# Patient Record
Sex: MALE | Race: WHITE | NOT HISPANIC OR LATINO | URBAN - NONMETROPOLITAN AREA
[De-identification: names, ages, dates, MRNs, and addresses within clinical notes are randomized per-mention and may not be internally consistent; named-entity substitution may affect disease eponyms.]

---

## 2018-08-28 ENCOUNTER — HOSPITAL ENCOUNTER (OUTPATIENT)
Dept: PHYSICAL THERAPY | Facility: HOSPITAL | Age: 43
Discharge: HOME | End: 2018-08-28

## 2018-08-28 PROCEDURE — 97799 UNLISTED PHYSCL MED/REHAB PX: CPT

## 2021-08-23 ENCOUNTER — NURSE TRIAGE (OUTPATIENT)
Dept: CALL CENTER | Facility: HOSPITAL | Age: 46
End: 2021-08-23

## 2021-08-23 ENCOUNTER — HOSPITAL ENCOUNTER (EMERGENCY)
Facility: HOSPITAL | Age: 46
Discharge: HOME | End: 2021-08-23
Attending: EMERGENCY MEDICINE | Admitting: EMERGENCY MEDICINE

## 2021-08-23 ENCOUNTER — TELEPHONE (OUTPATIENT)
Dept: EMERGENCY DEPT | Facility: HOSPITAL | Age: 46
End: 2021-08-23

## 2021-08-23 VITALS
RESPIRATION RATE: 18 BRPM | HEIGHT: 69 IN | WEIGHT: 180 LBS | SYSTOLIC BLOOD PRESSURE: 133 MMHG | TEMPERATURE: 100.5 F | OXYGEN SATURATION: 95 % | DIASTOLIC BLOOD PRESSURE: 76 MMHG | BODY MASS INDEX: 26.66 KG/M2 | HEART RATE: 106 BPM

## 2021-08-23 DIAGNOSIS — Z20.822 CLOSE EXPOSURE TO COVID-19 VIRUS: ICD-10-CM

## 2021-08-23 DIAGNOSIS — R50.9 FEVER, UNSPECIFIED FEVER CAUSE: ICD-10-CM

## 2021-08-23 DIAGNOSIS — R05.9 COUGH: Primary | ICD-10-CM

## 2021-08-23 LAB — SARS-COV-2 RNA RESP QL NAA+PROBE: DETECTED

## 2021-08-23 PROCEDURE — C9803 HOPD COVID-19 SPEC COLLECT: HCPCS

## 2021-08-23 PROCEDURE — 87635 SARS-COV-2 COVID-19 AMP PRB: CPT | Performed by: EMERGENCY MEDICINE

## 2021-08-23 PROCEDURE — 99283 EMERGENCY DEPT VISIT LOW MDM: CPT

## 2021-08-23 RX ORDER — ACETAMINOPHEN 500 MG
1000 TABLET ORAL ONCE
Status: COMPLETED | OUTPATIENT
Start: 2021-08-23 | End: 2021-08-23

## 2021-08-23 RX ADMIN — ACETAMINOPHEN 1000 MG: 500 TABLET, FILM COATED ORAL at 18:31

## 2021-08-23 NOTE — ED PROVIDER NOTES
Emergency Medicine Provider Note    Subjective:    HISTORY OF PRESENT ILLNESS     This is a very pleasant 46 y.o. male with no significant past medical history who presents to the emergency department today with a chief complaint of chills and body aches.  Gradual in onset over the past 1 day.  Constant.  Mild.  No exacerbating relieving factors and no associated symptoms.  No headache, vision changes, chest pain, shortness of breath, abdominal pain, nausea, vomiting, diarrhea, numbness, weakness, or paresthesias.  States multiple people have tested positive for COVID-19 at work and he would like to be tested.          History is obtained from the patient.       Review of Systems: All other systems are reviewed and are negative other than noted in the HPI.    Past Medical History:  No past medical history on file.    Allergies:  No Known Allergies    Past Surgical History:  No past surgical history on file.    Family History:  No family history on file.    Social History:  Social History     Socioeconomic History   • Marital status: Single     Spouse name: Not on file   • Number of children: Not on file   • Years of education: Not on file   • Highest education level: Not on file       Home Medications:  Prior to Admission medications    Not on File         Objective:    PHYSICAL EXAM     Vitals:   Vitals:    08/23/21 1747   BP: 133/76   Pulse: 106   Resp: 18   Temp: 100.5 °F (38.1 °C)   SpO2: 95%     GENERAL: Well appearing, in no acute distress.   HEENT: Moist mucous membranes, oropharynx clear without lesions, exudates, thrush.   EYES: No scleral icterus, conjunctivae clear.   NECK: No cervical lymphadenopathy, no stiffness.  CARDIAC: Normal rate, regular rhythm, no murmurs, 2+ peripheral pulses in all four extremities, normal capillary refill.   PULMONARY: Normal work of breathing on room air, lungs are clear to auscultation bilaterally without wheezes, crackles, rhonchi.  ABDOMINAL: Normal bowel sounds, abdomen  is soft, non-tender, non-distended, no hepatomegaly or splenomegaly.   MUSCULOSKELETAL: Normal range of motion, no lower extremity edema.  NEUROLOGIC: Alert and oriented x 3, EOM grossly intact and moves all four extremities with normal strength.  SKIN: Warm and dry without rashes.   PSYCHIATRIC: Mood and affect are normal.     PROCEDURES     Procedures    LAB AND RADIOLOGY RESULTS     Lab Results (last 24 hours)     ** No results found for the last 24 hours. **          No results found.    ED course:    Medications   acetaminophen (TYLENOL) tablet 1,000 mg (1,000 mg Oral Given 8/23/21 0541)          Amount and/or complexity of data reviewed:    • Clinical lab tests ordered and reviewed.  •       Risk of significant complications, morbidity, and/or mortality.    •  Presenting problem: low  •  Diagnostic procedures: low  •  Management options: low        MEDICAL DECISION MAKING     Patient presents with requesting COVID-19 testing. Upon arrival to the Emergency Department the patient is in no acute distress.  He does have a slight fever mild tachycardia.  He states he would just like a Covid test.  Low concern for meningitis with no headache, no neck stiffness, no rashes.  Low concern for pneumonia with no cough, no shortness of breath, no findings of this on physical exam.  Low concern for myocarditis with no chest pain.  Low concern for acute coronary syndrome or pulmonary embolism with no chest pain or shortness of breath.  Low concern for intra-abdominal infection with no abdominal pain, tenderness, nausea, or vomiting.  Low concern for urinary tract infection with no suprapubic pain, no flank pain, no dysuria.  Patient has no findings of cellulitis.  I did discuss that he has a fever and we could do a further work-up if you like but he states he just wants the COVID-19 test.  He has been tested for COVID-19.  He will be called with the results.        Diagnosis:    Final diagnoses:   Cough   Fever, unspecified  fever cause   Close exposure to COVID-19 virus         ED Disposition:     ED Disposition     ED Disposition Condition Comment    Discharge Stable           Provider, No Known  [facility]  [place] [address]  [phone]    Schedule an appointment as soon as possible for a visit in 2 days           Medication List      No changes were made to your prescriptions during this visit.              Lux Flanagan MD  08/25/21 4108

## 2021-08-23 NOTE — DISCHARGE INSTRUCTIONS
Please return immediately to the emergency department for any new or worsening symptoms. Please see your primary care provider in 2 days for re-evaluation of your symptoms.  As we discussed, if your fever persisting your Covid test is negative it could be another virus but if you develop any new symptoms return immediately to the ER for evaluation.

## 2021-08-24 ENCOUNTER — NURSE TRIAGE (OUTPATIENT)
Dept: CALL CENTER | Facility: HOSPITAL | Age: 46
End: 2021-08-24

## 2021-08-24 NOTE — TELEPHONE ENCOUNTER
"    Reason for Disposition  • [1] Follow-up call to recent contact AND [2] information only call, no triage required    Additional Information  • Negative: [1] Caller is not with the adult (patient) AND [2] reporting urgent symptoms  • Negative: Lab result questions  • Negative: Medication questions  • Negative: Caller can't be reached by phone  • Negative: Caller has already spoken to PCP or another triager  • Negative: RN needs further essential information from caller in order to complete triage  • Negative: Requesting regular office appointment  • Negative: [1] Caller requesting NON-URGENT health information AND [2] PCP's office is the best resource  • Negative: Health Information question, no triage required and triager able to answer question  • Negative: General information question, no triage required and triager able to answer question  • Negative: Question about upcoming scheduled test, no triage required and triager able to answer question  • Negative: [1] Caller is not with the adult (patient) AND [2] probable NON-URGENT symptoms    Answer Assessment - Initial Assessment Questions  1. REASON FOR CALL or QUESTION: \"What is your reason for calling today?\" or \"How can I best help you?\" or \"What question do you have that I can help answer?\"      Needing test result    Protocols used: INFORMATION ONLY CALL - NO TRIAGE-ADULT-      "

## 2021-08-24 NOTE — TELEPHONE ENCOUNTER
Reason for Disposition  • COVID-19 vaccine, Frequently Asked Questions (FAQs)    Additional Information  • Negative: COVID-19 lab test positive  • Negative: [1] Lives with someone known to have influenza (flu test positive) AND [2] flu-like symptoms (e.g., cough, runny nose, sore throat, SOB; with or without fever)  • Negative: [1] Symptoms of COVID-19 (e.g., cough, fever, SOB, or others) AND [2] HCP diagnosed COVID-19 based on symptoms  • Negative: [1] Symptoms of COVID-19 (e.g., cough, fever, SOB, or others) AND [2] lives in an area with community spread  • Negative: [1] Symptoms of COVID-19 (e.g., cough, fever, SOB, or others) AND [2] within 14 days of EXPOSURE (close contact) with diagnosed or suspected COVID-19 patient  • Negative: [1] Symptoms of COVID-19 (e.g., cough, fever, SOB, or others) AND [2] within 14 days of travel from high-risk area for COVID-19 community spread (identified by CDC)  • Negative: [1] Difficulty breathing (shortness of breath) occurs AND [2] onset > 14 days after COVID-19 EXPOSURE (Close Contact)  • Negative: [1] Dry cough occurs AND [2] onset > 14 days after COVID-19 EXPOSURE  • Negative: [1] Wet cough (i.e., white-yellow, yellow, green, or delia colored sputum) AND [2] onset > 14 days after COVID-19 EXPOSURE  • Negative: [1] Common cold symptoms AND [2] onset > 14 days after COVID-19 EXPOSURE  • [1] COVID-19 vaccine series completed (fully vaccinated) AND [2] COVID-19 EXPOSURE AND [3] no symptoms  • Negative: [1] Difficulty breathing or swallowing AND [2] starts within 2 hours after injection  • Negative: Sounds like a life-threatening emergency to the triager  • Negative: [1] Has NOT completed COVID-19 vaccine series AND [2] COVID-19 exposure AND [3] typical COVID-19 symptoms  • Negative: [1] Has NOT completed COVID-19 vaccine series AND [2]  COVID-19 exposure AND [3] no symptoms  • [1] COVID-19 vaccine series completed (fully vaccinated) AND [2] new-onset of COVID-19 symptoms  BUT [3] no known exposure  • Negative: [1] Typical COVID-19 symptoms AND [2] symptoms that are NOT expected from vaccine (e.g., cough, difficulty breathing, loss of taste or smell, runny nose, sore throat)  • Negative: [1] Typical COVID-19 symptoms AND [2] started > 3 days after getting vaccine  • Negative: Fever > 104 F (40 C)  • Negative: Sounds like a severe, unusual reaction to the triager  • Negative: [1] Redness or red streak around the injection site AND [2] started > 48 hours after getting vaccine AND [3] fever  • Negative: [1] Fever > 101 F (38.3 C) AND [2] age > 60 years AND [3] started > 48 hours after getting vaccine  • Negative: [1] Fever > 100.0 F (37.8 C) AND [2] bedridden (e.g., nursing home patient, CVA, chronic illness, recovering from surgery) AND [3] started > 48 hours after getting vaccine  • Negative: [1] Fever > 100.0 F (37.8 C) AND [2] diabetes mellitus or weak immune system (e.g., HIV positive, cancer chemo, splenectomy, organ transplant, chronic steroids) AND [3] started > 48 hours after getting vaccine  • Negative: Fever > 100.0 F (37.8 C) present > 3 days (72 hours)  • Negative: [1] Fever > 100.0 F (37.8 C) AND [2] healthcare worker  • Negative: [1] Redness around the injection site AND [2] started > 48 hours after getting vaccine AND [3] no fever  (Exception: red area < 1 inch or 2.5 cm wide)  • Negative: [1] Pain, tenderness, or swelling at the injection site AND [2] over 3 days (72 hours) since vaccine AND [3] getting worse  • Negative: [1] Pain, tenderness, or swelling at the injection site AND [2] lasts > 7 days  • Negative: [1] Lymph node swelling (i.e., armpit or neck on side of vaccine) AND [2] lasts > 3 weeks  • Negative: [1] Requesting COVID-19 vaccine AND [2] healthcare worker (e.g., EMS first responders, doctors, nurses)  • Negative: [1] Requesting COVID-19 vaccine AND [2] resident of a long-term care facility (e.g., nursing home)  • Negative: [1] Requesting COVID-19 vaccine  "AND [2] vaccine available in the community for this patient group  • Negative: COVID-19 vaccine, injection site reaction (e.g., pain, redness, swelling), question about  • Negative: COVID-19 vaccine, systemic reactions (e.g., fatigue, fever, muscle aches), questions about    Answer Assessment - Initial Assessment Questions  1. COVID-19 CLOSE CONTACT: \"Who is the person with the confirmed or suspected COVID-19 infection that you were exposed to?\"    son  2. PLACE of CONTACT: \"Where were you when you were exposed to COVID-19?\" (e.g., home, school, medical waiting room; which city?)     home  3. TYPE of CONTACT: \"How much contact was there?\" (e.g., sitting next to, live in same house, work in same office, same building)     [place]  4. DURATION of CONTACT: \"How long were you in contact with the COVID-19 patient?\" (e.g., a few seconds, passed by person, a few minutes, 15 minutes or longer, live with the patient)     General contact  5. MASK: \"Were you wearing a mask?\" \"Was the other person wearing a mask?\" Note: wearing a mask reduces the risk of an otherwise close contact.      no  6. DATE of CONTACT: \"When did you have contact with a COVID-19 patient?\" (e.g., how many days ago)      3 days  7. COMMUNITY SPREAD: \"Are there lots of cases of COVID-19 (community spread) where you live?\" (See public health department website, if unsure)        yes  8. SYMPTOMS: \"Do you have any symptoms?\" (e.g., fever, cough, breathing difficulty, loss of taste or smell)      None at the moment  9. PREGNANCY OR POSTPARTUM: \"Is there any chance you are pregnant?\" \"When was your last menstrual period?\" \"Did you deliver in the last 2 weeks?\"     no  10. HIGH RISK: \"Do you have any heart or lung problems?\" \"Do you have a weak immune system?\" (e.g., heart failure, COPD, asthma, HIV positive, chemotherapy, renal failure, diabetes mellitus, sickle cell anemia, obesity)       no  11. TRAVEL: \"Have you traveled out of the country recently?\" If " "Yes, ask: \"When and where?\" Also ask about out-of-state travel, since the [facility] has identified some high-risk cities for community spread in the [place]. Note: Travel becomes less relevant if there is widespread community transmission where the patient lives.        no    Answer Assessment - Initial Assessment Questions  1. MAIN CONCERN OR SYMPTOM:  \"What is your main concern right now?\" \"What question do you have?\" \"What's the main symptom you're worried about?\" (e.g., fever, pain, redness, swelling)    quarintine  2. VACCINE: \"What vaccination did you receive?\" \"Is this your first or second shot?\" (e.g., none; AstraZeneca, J&J, Moderna, Pfizer, other)      unsure  3. SYMPTOM ONSET: \"When did the not relevent begin?\" (e.g., not relevant; hours, days)       No symptoms  4. SYMPTOM SEVERITY: \"How bad is it?\"      na  5. FEVER: \"Is there a fever?\" If Yes, ask: \"What is it, how was it measured, and when did it start?\"      na  6. PAST REACTIONS: \"Have you reacted to immunizations before?\" If Yes, ask: \"What happened?\"     no  7. OTHER SYMPTOMS: \"Do you have any other symptoms?\"      no    Protocols used: CORONAVIRUS (COVID-19) VACCINE QUESTIONS AND REACTIONS-ADULT-AH, CORONAVIRUS (COVID-19) EXPOSURE-ADULT-AH      "

## 2022-08-25 PROCEDURE — 87635 SARS-COV-2 COVID-19 AMP PRB: CPT | Performed by: NURSE PRACTITIONER

## 2023-10-09 ENCOUNTER — APPOINTMENT (OUTPATIENT)
Dept: GENERAL RADIOLOGY | Age: 48
DRG: 282 | End: 2023-10-09

## 2023-10-09 ENCOUNTER — APPOINTMENT (OUTPATIENT)
Dept: CT IMAGING | Age: 48
DRG: 282 | End: 2023-10-09

## 2023-10-09 ENCOUNTER — HOSPITAL ENCOUNTER (INPATIENT)
Age: 48
LOS: 1 days | Discharge: HOME | DRG: 282 | End: 2023-10-10
Attending: EMERGENCY MEDICINE | Admitting: INTERNAL MEDICINE
Payer: COMMERCIAL

## 2023-10-09 DIAGNOSIS — I21.4 NSTEMI (NON-ST ELEVATED MYOCARDIAL INFARCTION) (HCC): Primary | ICD-10-CM

## 2023-10-09 LAB
ALBUMIN SERPL-MCNC: 3.9 G/DL (ref 3.5–5.2)
ALP SERPL-CCNC: 89 U/L (ref 40–130)
ALT SERPL-CCNC: 33 U/L (ref 5–41)
ANION GAP SERPL CALCULATED.3IONS-SCNC: 13 MMOL/L (ref 7–19)
APTT PPP: 24.1 SEC (ref 26–36.2)
APTT PPP: 41.2 SEC (ref 26–36.2)
AST SERPL-CCNC: 29 U/L (ref 5–40)
BASOPHILS # BLD: 0.1 K/UL (ref 0–0.2)
BASOPHILS NFR BLD: 1 % (ref 0–1)
BILIRUB SERPL-MCNC: 0.4 MG/DL (ref 0.2–1.2)
BNP BLD-MCNC: 145 PG/ML (ref 0–124)
BUN SERPL-MCNC: 12 MG/DL (ref 6–20)
CALCIUM SERPL-MCNC: 8.6 MG/DL (ref 8.6–10)
CHLORIDE SERPL-SCNC: 104 MMOL/L (ref 98–111)
CHOLEST SERPL-MCNC: 155 MG/DL (ref 160–199)
CO2 SERPL-SCNC: 25 MMOL/L (ref 22–29)
CREAT SERPL-MCNC: 1.1 MG/DL (ref 0.5–1.2)
D DIMER PPP FEU-MCNC: 0.75 UG/ML FEU (ref 0–0.48)
EKG P AXIS: 65 DEGREES
EKG P-R INTERVAL: 168 MS
EKG Q-T INTERVAL: 386 MS
EKG QRS DURATION: 92 MS
EKG QTC CALCULATION (BAZETT): 396 MS
EKG T AXIS: 57 DEGREES
EOSINOPHIL # BLD: 0.2 K/UL (ref 0–0.6)
EOSINOPHIL NFR BLD: 3.5 % (ref 0–5)
ERYTHROCYTE [DISTWIDTH] IN BLOOD BY AUTOMATED COUNT: 14.1 % (ref 11.5–14.5)
GLUCOSE SERPL-MCNC: 144 MG/DL (ref 74–109)
HBA1C MFR BLD: 5.6 % (ref 4–6)
HCT VFR BLD AUTO: 44.8 % (ref 42–52)
HDLC SERPL-MCNC: 40 MG/DL (ref 55–121)
HGB BLD-MCNC: 14.6 G/DL (ref 14–18)
IMM GRANULOCYTES # BLD: 0 K/UL
LDLC SERPL CALC-MCNC: 87 MG/DL
LYMPHOCYTES # BLD: 1.6 K/UL (ref 1.1–4.5)
LYMPHOCYTES NFR BLD: 34.2 % (ref 20–40)
MAGNESIUM SERPL-MCNC: 2.2 MG/DL (ref 1.6–2.6)
MCH RBC QN AUTO: 28.6 PG (ref 27–31)
MCHC RBC AUTO-ENTMCNC: 32.6 G/DL (ref 33–37)
MCV RBC AUTO: 87.8 FL (ref 80–94)
MONOCYTES # BLD: 0.8 K/UL (ref 0–0.9)
MONOCYTES NFR BLD: 16.5 % (ref 0–10)
NEUTROPHILS # BLD: 2.2 K/UL (ref 1.5–7.5)
NEUTS SEG NFR BLD: 44.8 % (ref 50–65)
PLATELET # BLD AUTO: 284 K/UL (ref 130–400)
PMV BLD AUTO: 9.6 FL (ref 9.4–12.4)
POTASSIUM SERPL-SCNC: 3.3 MMOL/L (ref 3.5–5)
PROT SERPL-MCNC: 6.7 G/DL (ref 6.6–8.7)
RBC # BLD AUTO: 5.1 M/UL (ref 4.7–6.1)
SODIUM SERPL-SCNC: 142 MMOL/L (ref 136–145)
TRIGL SERPL-MCNC: 139 MG/DL (ref 0–149)
TROPONIN T SERPL-MCNC: 0.1 NG/ML (ref 0–0.03)
TROPONIN T SERPL-MCNC: 0.24 NG/ML (ref 0–0.03)
TROPONIN T SERPL-MCNC: 0.26 NG/ML (ref 0–0.03)
WBC # BLD AUTO: 4.8 K/UL (ref 4.8–10.8)

## 2023-10-09 PROCEDURE — 6370000000 HC RX 637 (ALT 250 FOR IP): Performed by: EMERGENCY MEDICINE

## 2023-10-09 PROCEDURE — 96365 THER/PROPH/DIAG IV INF INIT: CPT

## 2023-10-09 PROCEDURE — 80061 LIPID PANEL: CPT

## 2023-10-09 PROCEDURE — 80053 COMPREHEN METABOLIC PANEL: CPT

## 2023-10-09 PROCEDURE — 83735 ASSAY OF MAGNESIUM: CPT

## 2023-10-09 PROCEDURE — 71275 CT ANGIOGRAPHY CHEST: CPT

## 2023-10-09 PROCEDURE — 83036 HEMOGLOBIN GLYCOSYLATED A1C: CPT

## 2023-10-09 PROCEDURE — 36415 COLL VENOUS BLD VENIPUNCTURE: CPT

## 2023-10-09 PROCEDURE — 85379 FIBRIN DEGRADATION QUANT: CPT

## 2023-10-09 PROCEDURE — 6360000004 HC RX CONTRAST MEDICATION: Performed by: EMERGENCY MEDICINE

## 2023-10-09 PROCEDURE — 2140000000 HC CCU INTERMEDIATE R&B

## 2023-10-09 PROCEDURE — 85025 COMPLETE CBC W/AUTO DIFF WBC: CPT

## 2023-10-09 PROCEDURE — 85730 THROMBOPLASTIN TIME PARTIAL: CPT

## 2023-10-09 PROCEDURE — 2580000003 HC RX 258: Performed by: NURSE PRACTITIONER

## 2023-10-09 PROCEDURE — 6360000002 HC RX W HCPCS: Performed by: EMERGENCY MEDICINE

## 2023-10-09 PROCEDURE — 6370000000 HC RX 637 (ALT 250 FOR IP): Performed by: INTERNAL MEDICINE

## 2023-10-09 PROCEDURE — 6370000000 HC RX 637 (ALT 250 FOR IP): Performed by: NURSE PRACTITIONER

## 2023-10-09 PROCEDURE — 93306 TTE W/DOPPLER COMPLETE: CPT

## 2023-10-09 PROCEDURE — 84484 ASSAY OF TROPONIN QUANT: CPT

## 2023-10-09 PROCEDURE — 83880 ASSAY OF NATRIURETIC PEPTIDE: CPT

## 2023-10-09 PROCEDURE — 71045 X-RAY EXAM CHEST 1 VIEW: CPT

## 2023-10-09 PROCEDURE — 99285 EMERGENCY DEPT VISIT HI MDM: CPT

## 2023-10-09 RX ORDER — SODIUM CHLORIDE 0.9 % (FLUSH) 0.9 %
5-40 SYRINGE (ML) INJECTION EVERY 12 HOURS SCHEDULED
Status: DISCONTINUED | OUTPATIENT
Start: 2023-10-09 | End: 2023-10-10 | Stop reason: HOSPADM

## 2023-10-09 RX ORDER — ASPIRIN 81 MG/1
324 TABLET, CHEWABLE ORAL ONCE
Status: COMPLETED | OUTPATIENT
Start: 2023-10-09 | End: 2023-10-09

## 2023-10-09 RX ORDER — ATORVASTATIN CALCIUM 40 MG/1
80 TABLET, FILM COATED ORAL NIGHTLY
Status: DISCONTINUED | OUTPATIENT
Start: 2023-10-09 | End: 2023-10-10 | Stop reason: HOSPADM

## 2023-10-09 RX ORDER — ACETAMINOPHEN 325 MG/1
650 TABLET ORAL EVERY 6 HOURS PRN
Status: DISCONTINUED | OUTPATIENT
Start: 2023-10-09 | End: 2023-10-10 | Stop reason: HOSPADM

## 2023-10-09 RX ORDER — HEPARIN SODIUM 1000 [USP'U]/ML
4000 INJECTION, SOLUTION INTRAVENOUS; SUBCUTANEOUS ONCE
Status: COMPLETED | OUTPATIENT
Start: 2023-10-09 | End: 2023-10-09

## 2023-10-09 RX ORDER — ACETAMINOPHEN 650 MG/1
650 SUPPOSITORY RECTAL EVERY 6 HOURS PRN
Status: DISCONTINUED | OUTPATIENT
Start: 2023-10-09 | End: 2023-10-10 | Stop reason: HOSPADM

## 2023-10-09 RX ORDER — ONDANSETRON 2 MG/ML
4 INJECTION INTRAMUSCULAR; INTRAVENOUS EVERY 6 HOURS PRN
Status: DISCONTINUED | OUTPATIENT
Start: 2023-10-09 | End: 2023-10-10 | Stop reason: HOSPADM

## 2023-10-09 RX ORDER — POLYETHYLENE GLYCOL 3350 17 G/17G
17 POWDER, FOR SOLUTION ORAL DAILY PRN
Status: DISCONTINUED | OUTPATIENT
Start: 2023-10-09 | End: 2023-10-10 | Stop reason: HOSPADM

## 2023-10-09 RX ORDER — HEPARIN SODIUM 1000 [USP'U]/ML
4000 INJECTION, SOLUTION INTRAVENOUS; SUBCUTANEOUS PRN
Status: DISCONTINUED | OUTPATIENT
Start: 2023-10-09 | End: 2023-10-10 | Stop reason: HOSPADM

## 2023-10-09 RX ORDER — HEPARIN SODIUM 1000 [USP'U]/ML
2000 INJECTION, SOLUTION INTRAVENOUS; SUBCUTANEOUS PRN
Status: DISCONTINUED | OUTPATIENT
Start: 2023-10-09 | End: 2023-10-10 | Stop reason: HOSPADM

## 2023-10-09 RX ORDER — HEPARIN SODIUM 10000 [USP'U]/100ML
5-30 INJECTION, SOLUTION INTRAVENOUS CONTINUOUS
Status: DISCONTINUED | OUTPATIENT
Start: 2023-10-09 | End: 2023-10-10 | Stop reason: HOSPADM

## 2023-10-09 RX ORDER — ASPIRIN 81 MG/1
81 TABLET, CHEWABLE ORAL DAILY
Status: DISCONTINUED | OUTPATIENT
Start: 2023-10-10 | End: 2023-10-10 | Stop reason: HOSPADM

## 2023-10-09 RX ORDER — SODIUM CHLORIDE 9 MG/ML
INJECTION, SOLUTION INTRAVENOUS PRN
Status: DISCONTINUED | OUTPATIENT
Start: 2023-10-09 | End: 2023-10-10 | Stop reason: HOSPADM

## 2023-10-09 RX ORDER — SODIUM CHLORIDE 0.9 % (FLUSH) 0.9 %
5-40 SYRINGE (ML) INJECTION PRN
Status: DISCONTINUED | OUTPATIENT
Start: 2023-10-09 | End: 2023-10-10 | Stop reason: HOSPADM

## 2023-10-09 RX ORDER — ONDANSETRON 4 MG/1
4 TABLET, ORALLY DISINTEGRATING ORAL EVERY 8 HOURS PRN
Status: DISCONTINUED | OUTPATIENT
Start: 2023-10-09 | End: 2023-10-10 | Stop reason: HOSPADM

## 2023-10-09 RX ADMIN — ACETAMINOPHEN 650 MG: 325 TABLET ORAL at 20:44

## 2023-10-09 RX ADMIN — ASPIRIN 324 MG: 81 TABLET, CHEWABLE ORAL at 07:18

## 2023-10-09 RX ADMIN — ATORVASTATIN CALCIUM 80 MG: 40 TABLET, FILM COATED ORAL at 20:31

## 2023-10-09 RX ADMIN — METOPROLOL TARTRATE 12.5 MG: 25 TABLET, FILM COATED ORAL at 11:53

## 2023-10-09 RX ADMIN — SODIUM CHLORIDE, PRESERVATIVE FREE 10 ML: 5 INJECTION INTRAVENOUS at 20:36

## 2023-10-09 RX ADMIN — HEPARIN SODIUM 12 UNITS/KG/HR: 10000 INJECTION, SOLUTION INTRAVENOUS at 08:12

## 2023-10-09 RX ADMIN — HEPARIN SODIUM 4000 UNITS: 1000 INJECTION INTRAVENOUS; SUBCUTANEOUS at 08:10

## 2023-10-09 RX ADMIN — IOPAMIDOL 75 ML: 755 INJECTION, SOLUTION INTRAVENOUS at 09:08

## 2023-10-09 RX ADMIN — HEPARIN SODIUM 4000 UNITS: 1000 INJECTION INTRAVENOUS; SUBCUTANEOUS at 20:34

## 2023-10-09 ASSESSMENT — PAIN DESCRIPTION - LOCATION
LOCATION: CHEST
LOCATION: HEAD

## 2023-10-09 ASSESSMENT — PAIN - FUNCTIONAL ASSESSMENT
PAIN_FUNCTIONAL_ASSESSMENT: 0-10
PAIN_FUNCTIONAL_ASSESSMENT: PREVENTS OR INTERFERES SOME ACTIVE ACTIVITIES AND ADLS

## 2023-10-09 ASSESSMENT — LIFESTYLE VARIABLES
HOW OFTEN DO YOU HAVE A DRINK CONTAINING ALCOHOL: NEVER
HOW MANY STANDARD DRINKS CONTAINING ALCOHOL DO YOU HAVE ON A TYPICAL DAY: PATIENT DOES NOT DRINK

## 2023-10-09 ASSESSMENT — ENCOUNTER SYMPTOMS
ABDOMINAL PAIN: 0
RESPIRATORY NEGATIVE: 1
EYES NEGATIVE: 1
GASTROINTESTINAL NEGATIVE: 1
COUGH: 0
NAUSEA: 0
TROUBLE SWALLOWING: 0
ABDOMINAL DISTENTION: 0
DIARRHEA: 0
CONSTIPATION: 0
SHORTNESS OF BREATH: 0
CHEST TIGHTNESS: 1
VOMITING: 0

## 2023-10-09 ASSESSMENT — PAIN DESCRIPTION - ORIENTATION
ORIENTATION: MID
ORIENTATION: LEFT

## 2023-10-09 ASSESSMENT — PAIN DESCRIPTION - DESCRIPTORS: DESCRIPTORS: ACHING

## 2023-10-09 ASSESSMENT — PAIN SCALES - GENERAL: PAINLEVEL_OUTOF10: 3

## 2023-10-09 NOTE — H&P
Gamaliel Espinoza - History & Physical      PCP: None, . (Inactive)    Date of Admission: 10/9/2023    Date of Service: 10/9/2023    Chief Complaint:  chest pain    History Of Present Illness: The patient is a 46 y.o. male past medical history of migraines who presented to [facility] ED complaining of chest pain. Patient reports around 630 this morning he began having chest tightness. He reports that his chest felt full. He reports having an odd sensation in his left arm that he cannot explain. Patient reports he also had diaphoresis at the time. Patient reports on Friday afternoon he began feeling ill with flulike symptoms. Patient denied cough or shortness of breath. Patient reports having fever, chills, and body aches. Patient reports symptoms resolved on Sunday and he is feeling back to normal.  In the ED patient was found to have elevated troponin of 0.10. EKG was unremarkable. Cardiology was consulted from the ED and patient was admitted to hospitalist service. Past Medical History:        Diagnosis Date    Anxiety        Past Surgical History:        Procedure Laterality Date    VENTRICULOPERITONEAL SHUNT         Home Medications:  Prior to Admission medications    Not on File       Allergies:    Patient has no known allergies. Social History:    The patient currently lives at home. Works at [employer]. Tobacco:   reports that he has never smoked. He has never used smokeless tobacco.  Alcohol:   reports no history of alcohol use. Illicit Drugs: denies    Family History:  History reviewed. No pertinent family history. Review of Systems:   Review of Systems   Constitutional:  Positive for chills, diaphoresis and fever. HENT:  Negative for trouble swallowing. Eyes:  Negative for visual disturbance. Respiratory:  Positive for chest tightness. Negative for cough and shortness of breath. Cardiovascular:  Positive for chest pain.  Negative for palpitations and leg

## 2023-10-09 NOTE — CONSULTS
[facility]  Cardiology Consult      Requesting MD:  Marni Eid MD   Admit Status:         History obtained from:   [] Patient  [] Other (specify):     Patient:  Alicja Gamez  [id number]     Chief Complaint:   Chief Complaint   Patient presents with    Chest Pain         HPI: Mr. Melly Kiran is a 46 y.o. male with a history of ventriculoperitoneal shunt placed in 1985 found to be nonfunctional 1996 prior tobacco abuse quit 7 years ago works as a  at the [employer] in [place] I believe in his usual state of health until this morning awakened and about 0 640 had onset of tightness in his chest fairly intense lasted a good 20+ minutes some diaphoresis and shortness of breath no nausea or vomiting came to the hospital symptoms are essentially subsided at this time. Troponin 0.1. EKG reported to be within normal limits. Denies any recent symptoms of exertional chest discomfort. CTA ordered. Echo ordered. Review of Systems:  Review of Systems   Constitutional: Negative. Negative for chills, fever and unexpected weight change. HENT: Negative. Eyes: Negative. Respiratory: Negative. Negative for shortness of breath. Cardiovascular: Negative. Negative for chest pain. Gastrointestinal: Negative. Negative for diarrhea, nausea and vomiting. Endocrine: Negative. Genitourinary: Negative. Musculoskeletal: Negative. Skin: Negative. Neurological: Negative. All other systems reviewed and are negative. Cardiac Specific Data:  Specialty Problems          Cardiology Problems    * (Principal) NSTEMI (non-ST elevated myocardial infarction) [facility])           Past Medical History:  Past Medical History:   Diagnosis Date    Anxiety         Past Surgical History:  Past Surgical History:   Procedure Laterality Date    VENTRICULOPERITONEAL SHUNT         Past Family History:  History reviewed. No pertinent family history.     Past Social

## 2023-10-10 ENCOUNTER — APPOINTMENT (OUTPATIENT)
Dept: CARDIAC CATH/INVASIVE PROCEDURES | Age: 48
DRG: 282 | End: 2023-10-10

## 2023-10-10 VITALS
SYSTOLIC BLOOD PRESSURE: 107 MMHG | BODY MASS INDEX: 26.38 KG/M2 | HEIGHT: 69 IN | WEIGHT: 178.13 LBS | RESPIRATION RATE: 16 BRPM | OXYGEN SATURATION: 97 % | DIASTOLIC BLOOD PRESSURE: 80 MMHG | TEMPERATURE: 97.7 F | HEART RATE: 75 BPM

## 2023-10-10 LAB
ALBUMIN SERPL-MCNC: 3.6 G/DL (ref 3.5–5.2)
ALP SERPL-CCNC: 86 U/L (ref 40–130)
ALT SERPL-CCNC: 32 U/L (ref 5–41)
ANION GAP SERPL CALCULATED.3IONS-SCNC: 8 MMOL/L (ref 7–19)
ANION GAP SERPL CALCULATED.3IONS-SCNC: 9 MMOL/L (ref 7–19)
APTT PPP: 102.9 SEC (ref 26–36.2)
APTT PPP: 84 SEC (ref 26–36.2)
AST SERPL-CCNC: 30 U/L (ref 5–40)
BILIRUB SERPL-MCNC: 0.3 MG/DL (ref 0.2–1.2)
BUN SERPL-MCNC: 14 MG/DL (ref 6–20)
BUN SERPL-MCNC: 15 MG/DL (ref 6–20)
CALCIUM SERPL-MCNC: 8.4 MG/DL (ref 8.6–10)
CALCIUM SERPL-MCNC: 8.6 MG/DL (ref 8.6–10)
CHLORIDE SERPL-SCNC: 104 MMOL/L (ref 98–111)
CHLORIDE SERPL-SCNC: 105 MMOL/L (ref 98–111)
CO2 SERPL-SCNC: 28 MMOL/L (ref 22–29)
CO2 SERPL-SCNC: 29 MMOL/L (ref 22–29)
CREAT SERPL-MCNC: 1 MG/DL (ref 0.5–1.2)
CREAT SERPL-MCNC: 1.1 MG/DL (ref 0.5–1.2)
ERYTHROCYTE [DISTWIDTH] IN BLOOD BY AUTOMATED COUNT: 14.5 % (ref 11.5–14.5)
GLUCOSE SERPL-MCNC: 102 MG/DL (ref 74–109)
GLUCOSE SERPL-MCNC: 104 MG/DL (ref 74–109)
HCT VFR BLD AUTO: 41.4 % (ref 42–52)
HGB BLD-MCNC: 13.4 G/DL (ref 14–18)
MCH RBC QN AUTO: 28.6 PG (ref 27–31)
MCHC RBC AUTO-ENTMCNC: 32.4 G/DL (ref 33–37)
MCV RBC AUTO: 88.3 FL (ref 80–94)
PLATELET # BLD AUTO: 253 K/UL (ref 130–400)
PMV BLD AUTO: 9.9 FL (ref 9.4–12.4)
POTASSIUM SERPL-SCNC: 3.6 MMOL/L (ref 3.5–5)
POTASSIUM SERPL-SCNC: 3.7 MMOL/L (ref 3.5–5)
PROT SERPL-MCNC: 6 G/DL (ref 6.6–8.7)
RBC # BLD AUTO: 4.69 M/UL (ref 4.7–6.1)
SODIUM SERPL-SCNC: 141 MMOL/L (ref 136–145)
SODIUM SERPL-SCNC: 142 MMOL/L (ref 136–145)
TROPONIN T SERPL-MCNC: 0.2 NG/ML (ref 0–0.03)
WBC # BLD AUTO: 5.1 K/UL (ref 4.8–10.8)

## 2023-10-10 PROCEDURE — 93458 L HRT ARTERY/VENTRICLE ANGIO: CPT

## 2023-10-10 PROCEDURE — 85027 COMPLETE CBC AUTOMATED: CPT

## 2023-10-10 PROCEDURE — 2709999900 HC NON-CHARGEABLE SUPPLY

## 2023-10-10 PROCEDURE — 6370000000 HC RX 637 (ALT 250 FOR IP): Performed by: NURSE PRACTITIONER

## 2023-10-10 PROCEDURE — 99152 MOD SED SAME PHYS/QHP 5/>YRS: CPT | Performed by: INTERNAL MEDICINE

## 2023-10-10 PROCEDURE — 6370000000 HC RX 637 (ALT 250 FOR IP): Performed by: INTERNAL MEDICINE

## 2023-10-10 PROCEDURE — 2500000003 HC RX 250 WO HCPCS

## 2023-10-10 PROCEDURE — 84484 ASSAY OF TROPONIN QUANT: CPT

## 2023-10-10 PROCEDURE — B2111ZZ FLUOROSCOPY OF MULTIPLE CORONARY ARTERIES USING LOW OSMOLAR CONTRAST: ICD-10-PCS | Performed by: INTERNAL MEDICINE

## 2023-10-10 PROCEDURE — 6360000002 HC RX W HCPCS: Performed by: EMERGENCY MEDICINE

## 2023-10-10 PROCEDURE — C1769 GUIDE WIRE: HCPCS

## 2023-10-10 PROCEDURE — 93458 L HRT ARTERY/VENTRICLE ANGIO: CPT | Performed by: INTERNAL MEDICINE

## 2023-10-10 PROCEDURE — 4A023N7 MEASUREMENT OF CARDIAC SAMPLING AND PRESSURE, LEFT HEART, PERCUTANEOUS APPROACH: ICD-10-PCS | Performed by: INTERNAL MEDICINE

## 2023-10-10 PROCEDURE — 85730 THROMBOPLASTIN TIME PARTIAL: CPT

## 2023-10-10 PROCEDURE — 6360000002 HC RX W HCPCS

## 2023-10-10 PROCEDURE — 99152 MOD SED SAME PHYS/QHP 5/>YRS: CPT

## 2023-10-10 PROCEDURE — 2580000003 HC RX 258: Performed by: INTERNAL MEDICINE

## 2023-10-10 PROCEDURE — C1894 INTRO/SHEATH, NON-LASER: HCPCS

## 2023-10-10 PROCEDURE — 80053 COMPREHEN METABOLIC PANEL: CPT

## 2023-10-10 PROCEDURE — B2151ZZ FLUOROSCOPY OF LEFT HEART USING LOW OSMOLAR CONTRAST: ICD-10-PCS | Performed by: INTERNAL MEDICINE

## 2023-10-10 PROCEDURE — 94760 N-INVAS EAR/PLS OXIMETRY 1: CPT

## 2023-10-10 PROCEDURE — 6360000004 HC RX CONTRAST MEDICATION: Performed by: INTERNAL MEDICINE

## 2023-10-10 PROCEDURE — 36415 COLL VENOUS BLD VENIPUNCTURE: CPT

## 2023-10-10 RX ORDER — HYDRALAZINE HYDROCHLORIDE 20 MG/ML
10 INJECTION INTRAMUSCULAR; INTRAVENOUS EVERY 10 MIN PRN
Status: DISCONTINUED | OUTPATIENT
Start: 2023-10-10 | End: 2023-10-10 | Stop reason: HOSPADM

## 2023-10-10 RX ORDER — NITROGLYCERIN 0.4 MG/1
0.4 TABLET SUBLINGUAL EVERY 5 MIN PRN
Qty: 25 TABLET | Refills: 3 | Status: SHIPPED | OUTPATIENT
Start: 2023-10-10

## 2023-10-10 RX ORDER — ASPIRIN 81 MG/1
81 TABLET, CHEWABLE ORAL DAILY
Qty: 90 TABLET | Refills: 3 | Status: SHIPPED | OUTPATIENT
Start: 2023-10-11

## 2023-10-10 RX ORDER — SODIUM CHLORIDE 9 MG/ML
INJECTION, SOLUTION INTRAVENOUS CONTINUOUS
Status: DISCONTINUED | OUTPATIENT
Start: 2023-10-10 | End: 2023-10-10 | Stop reason: HOSPADM

## 2023-10-10 RX ORDER — METOPROLOL SUCCINATE 25 MG/1
25 TABLET, EXTENDED RELEASE ORAL DAILY
Qty: 90 TABLET | Refills: 3 | Status: SHIPPED | OUTPATIENT
Start: 2023-10-10

## 2023-10-10 RX ORDER — ATORVASTATIN CALCIUM 80 MG/1
80 TABLET, FILM COATED ORAL NIGHTLY
Qty: 90 TABLET | Refills: 3 | Status: SHIPPED | OUTPATIENT
Start: 2023-10-10

## 2023-10-10 RX ORDER — NITROGLYCERIN 0.4 MG/1
0.4 TABLET SUBLINGUAL EVERY 5 MIN PRN
Qty: 25 TABLET | Refills: 3 | Status: CANCELLED | OUTPATIENT
Start: 2023-10-10

## 2023-10-10 RX ORDER — NITROGLYCERIN 0.4 MG/1
0.4 TABLET SUBLINGUAL EVERY 5 MIN PRN
Status: DISCONTINUED | OUTPATIENT
Start: 2023-10-10 | End: 2023-10-10 | Stop reason: HOSPADM

## 2023-10-10 RX ORDER — METOPROLOL SUCCINATE 25 MG/1
25 TABLET, EXTENDED RELEASE ORAL DAILY
Status: DISCONTINUED | OUTPATIENT
Start: 2023-10-10 | End: 2023-10-10 | Stop reason: HOSPADM

## 2023-10-10 RX ORDER — CLOPIDOGREL BISULFATE 75 MG/1
75 TABLET ORAL DAILY
Status: DISCONTINUED | OUTPATIENT
Start: 2023-10-10 | End: 2023-10-10 | Stop reason: HOSPADM

## 2023-10-10 RX ORDER — CLOPIDOGREL BISULFATE 75 MG/1
75 TABLET ORAL DAILY
Qty: 90 TABLET | Refills: 3 | Status: SHIPPED | OUTPATIENT
Start: 2023-10-10

## 2023-10-10 RX ORDER — CLOPIDOGREL BISULFATE 75 MG/1
75 TABLET ORAL DAILY
Qty: 90 TABLET | Refills: 3 | Status: CANCELLED | OUTPATIENT
Start: 2023-10-10

## 2023-10-10 RX ORDER — METOPROLOL SUCCINATE 25 MG/1
25 TABLET, EXTENDED RELEASE ORAL DAILY
Qty: 90 TABLET | Refills: 3 | Status: CANCELLED | OUTPATIENT
Start: 2023-10-10

## 2023-10-10 RX ORDER — SODIUM CHLORIDE 9 MG/ML
INJECTION, SOLUTION INTRAVENOUS CONTINUOUS
Status: ACTIVE | OUTPATIENT
Start: 2023-10-10 | End: 2023-10-10

## 2023-10-10 RX ADMIN — ASPIRIN 81 MG: 81 TABLET, CHEWABLE ORAL at 08:27

## 2023-10-10 RX ADMIN — IOPAMIDOL 110 ML: 612 INJECTION, SOLUTION INTRAVENOUS at 10:31

## 2023-10-10 RX ADMIN — SODIUM CHLORIDE: 9 INJECTION, SOLUTION INTRAVENOUS at 08:29

## 2023-10-10 RX ADMIN — HEPARIN SODIUM 15 UNITS/KG/HR: 10000 INJECTION, SOLUTION INTRAVENOUS at 06:43

## 2023-10-10 RX ADMIN — CLOPIDOGREL BISULFATE 75 MG: 75 TABLET ORAL at 11:37

## 2023-10-10 RX ADMIN — METOPROLOL SUCCINATE 25 MG: 25 TABLET, FILM COATED, EXTENDED RELEASE ORAL at 11:37

## 2023-10-10 NOTE — PROGRESS NOTES
Arrived to cath holding from 717 to await a cardiac cath. Denies any c/o pain. Has Heparin gtt infusing at 15 units/kg/ hr and NS infusing at 75ml hr. Both groin sites prepped  and shaved and pulses checked.

## 2023-10-10 NOTE — PROGRESS NOTES
Catheterization pulmonary findings right radial artery access tolerated well  Left ventricular function satisfactory  Coronary angiograms no significant lesions minimal luminal irregularities medical management recommended May be discharged later today on medical therapy.

## 2023-10-10 NOTE — DISCHARGE SUMMARY
Say Mathew  :  1975  MRN:  [id number]    Admit date:  10/9/2023  Discharge date:  10/10/2023    Discharging Physician:  Dr. Iva Michaud Directive: Full Code    Consults: Kmiani So     Primary Care Physician:  None, . (Inactive)    Discharge Diagnoses:  Principal Problem:    NSTEMI (non-ST elevated myocardial infarction) ([address])  Resolved Problems:    * No resolved hospital problems. *      Portions of this note have been copied forward, however, changed to reflect the most current clinical status of this patient. Hospital Course: The patient is a 46 y.o. male past medical history of migraines who presented to [facility] ED complaining of chest pain. Patient reports around 630 this morning he began having chest tightness. He reports that his chest felt full. He reports having an odd sensation in his left arm that he cannot explain. Patient reports he also had diaphoresis at the time. Patient reports on Friday afternoon he began feeling ill with flulike symptoms. Patient denied cough or shortness of breath. Patient reports having fever, chills, and body aches. Patient reports symptoms resolved on  and he is feeling back to normal.  In the ED patient was found to have elevated troponin of 0.10. EKG was unremarkable. Cardiology was consulted from the ED and patient was admitted to hospitalist service. Patient was placed on heparin drip at admission. Echo obtained, indicated EF of 60% with normal left ventricular cavity and overall normal systolic function, mild LVH. Monitored on telemetry overnight without ectopy. Troponin was trended 0.10, 0.24, 0.26, 0.20. Cardiology took patient for cardiac catheterization which indicated significant lesions and minimal luminal irregularities. Cardiology recommends medical management with aspirin, Plavix, and statin therapy. Patient has scheduled follow-up with cardiology.   At this point patient is now medically

## 2023-10-11 ENCOUNTER — TELEPHONE (OUTPATIENT)
Dept: PRIMARY CARE CLINIC | Age: 48
End: 2023-10-11

## 2023-10-11 NOTE — TELEPHONE ENCOUNTER
Tried to call patient to schedule him a Hospital Follow up, I was unable to leave a message. I will try again at a later date.

## 2023-10-11 NOTE — TELEPHONE ENCOUNTER
Care Transitions Initial Follow Up Call  Patient will be a new patient unable to schedule appointment. Outreach made within 2 business days of discharge: Yes    Patient: Say Mathew   Patient : 1975   MRN: [id number]   Reason for Admission: Chest pain    Discharge Date: 10/10/23    Discharge Diagnoses:  Principal Problem:    NSTEMI (non-ST elevated myocardial infarction) [facility])  Resolved Problems:    * No resolved hospital problems. *     Spoke with: Unable to contact patient at this time. Will reach out at a later date.    Discharge department/facility: [address] [place] [address]    Scheduled appointment with PCP within 7-14 days    Follow Up  Future Appointments   Date Time Provider [address] [place] [address]   2023  2:45 PM Gold Spear MD [email or web address], [place]

## 2023-10-12 ENCOUNTER — TELEPHONE (OUTPATIENT)
Dept: PRIMARY CARE CLINIC | Age: 48
End: 2023-10-12

## 2023-10-12 NOTE — TELEPHONE ENCOUNTER
Care Transitions Initial Follow Up Call    Outreach made within 2 business days of discharge: Yes    Patient: Manuela Hartmann   Patient : 1975   MRN: [id number]   Reason for Admission:Chest pain   Discharge Date: 10/10/23    Discharge Diagnoses:  Principal Problem:    NSTEMI (non-ST elevated myocardial infarction) [facility])  Resolved Problems:    * No resolved hospital problems. *                   Spoke with: Attempted to make contact with patient for an transitions of care follow up call post discharge without success.       Discharge department/facility: [address] [place] [address]        Scheduled appointment with PCP within 7-14 days    Follow Up  Future Appointments   Date Time Provider [address] [place] [address]   2023  2:45 PM Karla Black MD N [facility] [place]       Formerly [facility], [address] [place] [address]

## 2023-10-30 ENCOUNTER — HOSPITAL ENCOUNTER (INPATIENT)
Age: 48
LOS: 7 days | Discharge: HOME | DRG: 844 | End: 2023-11-06
Attending: EMERGENCY MEDICINE | Admitting: INTERNAL MEDICINE
Payer: COMMERCIAL

## 2023-10-30 DIAGNOSIS — K92.2 GI BLEED: ICD-10-CM

## 2023-10-30 DIAGNOSIS — R55 SYNCOPE AND COLLAPSE: ICD-10-CM

## 2023-10-30 DIAGNOSIS — I95.9 HYPOTENSION, UNSPECIFIED HYPOTENSION TYPE: ICD-10-CM

## 2023-10-30 DIAGNOSIS — K92.2 ACUTE GI BLEEDING: Primary | ICD-10-CM

## 2023-10-30 DIAGNOSIS — D64.9 SYMPTOMATIC ANEMIA: ICD-10-CM

## 2023-10-30 LAB
ABO/RH: NORMAL
ALBUMIN SERPL-MCNC: 3.9 G/DL (ref 3.5–5.2)
ALP SERPL-CCNC: 77 U/L (ref 40–130)
ALT SERPL-CCNC: 17 U/L (ref 5–41)
ANION GAP SERPL CALCULATED.3IONS-SCNC: 9 MMOL/L (ref 7–19)
ANTIBODY SCREEN: NORMAL
APTT PPP: 26.2 SEC (ref 26–36.2)
AST SERPL-CCNC: 14 U/L (ref 5–40)
BASOPHILS # BLD: 0.1 K/UL (ref 0–0.2)
BASOPHILS NFR BLD: 0.9 % (ref 0–1)
BILIRUB SERPL-MCNC: 0.3 MG/DL (ref 0.2–1.2)
BLOOD BANK DISPENSE STATUS: NORMAL
BLOOD BANK DISPENSE STATUS: NORMAL
BLOOD BANK PRODUCT CODE: NORMAL
BLOOD BANK PRODUCT CODE: NORMAL
BPU ID: NORMAL
BPU ID: NORMAL
BUN SERPL-MCNC: 17 MG/DL (ref 6–20)
CALCIUM SERPL-MCNC: 8.4 MG/DL (ref 8.6–10)
CHLORIDE SERPL-SCNC: 106 MMOL/L (ref 98–111)
CO2 SERPL-SCNC: 26 MMOL/L (ref 22–29)
CREAT SERPL-MCNC: 0.9 MG/DL (ref 0.5–1.2)
DESCRIPTION BLOOD BANK: NORMAL
DESCRIPTION BLOOD BANK: NORMAL
EOSINOPHIL # BLD: 0.2 K/UL (ref 0–0.6)
EOSINOPHIL NFR BLD: 1.8 % (ref 0–5)
ERYTHROCYTE [DISTWIDTH] IN BLOOD BY AUTOMATED COUNT: 14.3 % (ref 11.5–14.5)
GLUCOSE SERPL-MCNC: 125 MG/DL (ref 74–109)
HCT VFR BLD AUTO: 37.9 % (ref 42–52)
HCT VFR BLD AUTO: 41.3 % (ref 42–52)
HGB BLD-MCNC: 12.6 G/DL (ref 14–18)
HGB BLD-MCNC: 13.8 G/DL (ref 14–18)
IMM GRANULOCYTES # BLD: 0 K/UL
INR PPP: 1.14 (ref 0.88–1.18)
LACTATE BLDV-SCNC: 1.8 MG/DL (ref 0.5–1.9)
LYMPHOCYTES # BLD: 3.3 K/UL (ref 1.1–4.5)
LYMPHOCYTES NFR BLD: 37.3 % (ref 20–40)
MCH RBC QN AUTO: 29.4 PG (ref 27–31)
MCHC RBC AUTO-ENTMCNC: 33.2 G/DL (ref 33–37)
MCV RBC AUTO: 88.6 FL (ref 80–94)
MONOCYTES # BLD: 0.6 K/UL (ref 0–0.9)
MONOCYTES NFR BLD: 6.6 % (ref 0–10)
NEUTROPHILS # BLD: 4.8 K/UL (ref 1.5–7.5)
NEUTS SEG NFR BLD: 53.3 % (ref 50–65)
PLATELET # BLD AUTO: 365 K/UL (ref 130–400)
PMV BLD AUTO: 9.7 FL (ref 9.4–12.4)
POTASSIUM SERPL-SCNC: 4.1 MMOL/L (ref 3.5–5)
PROT SERPL-MCNC: 6.1 G/DL (ref 6.6–8.7)
PROTHROMBIN TIME: 14.3 SEC (ref 12–14.6)
RBC # BLD AUTO: 4.28 M/UL (ref 4.7–6.1)
SODIUM SERPL-SCNC: 141 MMOL/L (ref 136–145)
TROPONIN T SERPL-MCNC: <0.01 NG/ML (ref 0–0.03)
WBC # BLD AUTO: 8.9 K/UL (ref 4.8–10.8)

## 2023-10-30 PROCEDURE — 96374 THER/PROPH/DIAG INJ IV PUSH: CPT

## 2023-10-30 PROCEDURE — 86900 BLOOD TYPING SEROLOGIC ABO: CPT

## 2023-10-30 PROCEDURE — 2100000000 HC CCU R&B

## 2023-10-30 PROCEDURE — 85014 HEMATOCRIT: CPT

## 2023-10-30 PROCEDURE — 2580000003 HC RX 258: Performed by: EMERGENCY MEDICINE

## 2023-10-30 PROCEDURE — 85025 COMPLETE CBC W/AUTO DIFF WBC: CPT

## 2023-10-30 PROCEDURE — 85730 THROMBOPLASTIN TIME PARTIAL: CPT

## 2023-10-30 PROCEDURE — 6360000002 HC RX W HCPCS

## 2023-10-30 PROCEDURE — 99285 EMERGENCY DEPT VISIT HI MDM: CPT

## 2023-10-30 PROCEDURE — 84484 ASSAY OF TROPONIN QUANT: CPT

## 2023-10-30 PROCEDURE — 86923 COMPATIBILITY TEST ELECTRIC: CPT

## 2023-10-30 PROCEDURE — 80053 COMPREHEN METABOLIC PANEL: CPT

## 2023-10-30 PROCEDURE — 86850 RBC ANTIBODY SCREEN: CPT

## 2023-10-30 PROCEDURE — 36415 COLL VENOUS BLD VENIPUNCTURE: CPT

## 2023-10-30 PROCEDURE — 85018 HEMOGLOBIN: CPT

## 2023-10-30 PROCEDURE — 86920 COMPATIBILITY TEST SPIN: CPT

## 2023-10-30 PROCEDURE — 2580000003 HC RX 258: Performed by: INTERNAL MEDICINE

## 2023-10-30 PROCEDURE — 85610 PROTHROMBIN TIME: CPT

## 2023-10-30 PROCEDURE — P9040 RBC LEUKOREDUCED IRRADIATED: HCPCS

## 2023-10-30 PROCEDURE — P9016 RBC LEUKOCYTES REDUCED: HCPCS

## 2023-10-30 PROCEDURE — 83605 ASSAY OF LACTIC ACID: CPT

## 2023-10-30 PROCEDURE — P9073 PLATELETS PHERESIS PATH REDU: HCPCS

## 2023-10-30 PROCEDURE — C9113 INJ PANTOPRAZOLE SODIUM, VIA: HCPCS | Performed by: EMERGENCY MEDICINE

## 2023-10-30 PROCEDURE — 6360000002 HC RX W HCPCS: Performed by: EMERGENCY MEDICINE

## 2023-10-30 PROCEDURE — 86901 BLOOD TYPING SEROLOGIC RH(D): CPT

## 2023-10-30 PROCEDURE — 93005 ELECTROCARDIOGRAM TRACING: CPT

## 2023-10-30 RX ORDER — SODIUM CHLORIDE 9 MG/ML
INJECTION, SOLUTION INTRAVENOUS PRN
Status: DISCONTINUED | OUTPATIENT
Start: 2023-10-30 | End: 2023-11-01 | Stop reason: ALTCHOICE

## 2023-10-30 RX ORDER — HYDROCORTISONE 25 MG/G
CREAM TOPICAL 2 TIMES DAILY
Status: DISCONTINUED | OUTPATIENT
Start: 2023-10-30 | End: 2023-11-06 | Stop reason: HOSPADM

## 2023-10-30 RX ORDER — ONDANSETRON 2 MG/ML
4 INJECTION INTRAMUSCULAR; INTRAVENOUS EVERY 6 HOURS PRN
Status: DISCONTINUED | OUTPATIENT
Start: 2023-10-30 | End: 2023-11-06 | Stop reason: HOSPADM

## 2023-10-30 RX ORDER — SODIUM CHLORIDE 0.9 % (FLUSH) 0.9 %
10 SYRINGE (ML) INJECTION EVERY 12 HOURS SCHEDULED
Status: DISCONTINUED | OUTPATIENT
Start: 2023-10-30 | End: 2023-11-01

## 2023-10-30 RX ORDER — ACETAMINOPHEN 650 MG/1
650 SUPPOSITORY RECTAL EVERY 6 HOURS PRN
Status: DISCONTINUED | OUTPATIENT
Start: 2023-10-30 | End: 2023-11-06 | Stop reason: HOSPADM

## 2023-10-30 RX ORDER — ONDANSETRON 2 MG/ML
4 INJECTION INTRAMUSCULAR; INTRAVENOUS ONCE
Status: COMPLETED | OUTPATIENT
Start: 2023-10-30 | End: 2023-10-30

## 2023-10-30 RX ORDER — PANTOPRAZOLE SODIUM 40 MG/10ML
80 INJECTION, POWDER, LYOPHILIZED, FOR SOLUTION INTRAVENOUS ONCE
Status: COMPLETED | OUTPATIENT
Start: 2023-10-30 | End: 2023-10-30

## 2023-10-30 RX ORDER — ONDANSETRON 2 MG/ML
INJECTION INTRAMUSCULAR; INTRAVENOUS
Status: COMPLETED
Start: 2023-10-30 | End: 2023-10-30

## 2023-10-30 RX ORDER — SODIUM CHLORIDE 0.9 % (FLUSH) 0.9 %
10 SYRINGE (ML) INJECTION PRN
Status: DISCONTINUED | OUTPATIENT
Start: 2023-10-30 | End: 2023-11-06 | Stop reason: HOSPADM

## 2023-10-30 RX ORDER — SODIUM CHLORIDE 9 MG/ML
INJECTION, SOLUTION INTRAVENOUS PRN
Status: DISCONTINUED | OUTPATIENT
Start: 2023-10-30 | End: 2023-11-06 | Stop reason: HOSPADM

## 2023-10-30 RX ORDER — SODIUM CHLORIDE 9 MG/ML
INJECTION, SOLUTION INTRAVENOUS CONTINUOUS
Status: DISCONTINUED | OUTPATIENT
Start: 2023-10-30 | End: 2023-11-01

## 2023-10-30 RX ORDER — ONDANSETRON 4 MG/1
4 TABLET, ORALLY DISINTEGRATING ORAL EVERY 8 HOURS PRN
Status: DISCONTINUED | OUTPATIENT
Start: 2023-10-30 | End: 2023-11-06 | Stop reason: HOSPADM

## 2023-10-30 RX ORDER — ACETAMINOPHEN 325 MG/1
650 TABLET ORAL EVERY 6 HOURS PRN
Status: DISCONTINUED | OUTPATIENT
Start: 2023-10-30 | End: 2023-11-06 | Stop reason: HOSPADM

## 2023-10-30 RX ADMIN — ONDANSETRON 4 MG: 2 INJECTION INTRAMUSCULAR; INTRAVENOUS at 15:54

## 2023-10-30 RX ADMIN — PANTOPRAZOLE SODIUM 8 MG/HR: 40 INJECTION, POWDER, FOR SOLUTION INTRAVENOUS at 15:36

## 2023-10-30 RX ADMIN — ONDANSETRON 4 MG: 2 INJECTION INTRAMUSCULAR; INTRAVENOUS at 17:12

## 2023-10-30 RX ADMIN — SODIUM CHLORIDE: 9 INJECTION, SOLUTION INTRAVENOUS at 21:51

## 2023-10-30 RX ADMIN — PANTOPRAZOLE SODIUM 80 MG: 40 INJECTION, POWDER, FOR SOLUTION INTRAVENOUS at 16:24

## 2023-10-30 RX ADMIN — SODIUM CHLORIDE, PRESERVATIVE FREE 10 ML: 5 INJECTION INTRAVENOUS at 21:15

## 2023-10-30 ASSESSMENT — LIFESTYLE VARIABLES: HOW OFTEN DO YOU HAVE A DRINK CONTAINING ALCOHOL: NEVER

## 2023-10-30 ASSESSMENT — ENCOUNTER SYMPTOMS
SHORTNESS OF BREATH: 0
NAUSEA: 1
COUGH: 0
BLOOD IN STOOL: 1
VOMITING: 0

## 2023-10-30 NOTE — H&P
pulmonary arterial filling defect is seen. No lymphadenopathy detected. No consolidation, pleural effusion or pneumothorax identified. No acute abnormality is seen within the upper abdomen. No acute osseous abnormality is detected.  ------------------------------     No pulmonary embolus or other acute cardiopulmonary process. ---------------------------  All CT scans are performed using dose optimization techniques as appropriate to the performed exam and include at least one of the following: Automated exposure control, adjustment of the mA and/or kV according to size, and the use of iterative reconstruction technique. ______________________________________ Electronically signed by: Graciela Simon M.D. Date:     10/09/2023 Time:    09:25     XR CHEST PORTABLE    Result Date: 10/9/2023  EXAM: CHEST RADIOGRAPH  TECHNIQUE: Single frontal chest radiograph. HISTORY: Chest pain. COMPARISON: None. FINDINGS:  All the patient is mildly leaning to the right. EKG leads project over the chest.  A ventriculoperitoneal shunt catheter projects over the right chest.  Calcification along portions of the catheter. No pulmonary infiltrate is identified. No pleural effusion or pneumothorax is seen. Heart size is normal. No acute displaced rib fractures are identified. 1.  No acute findings in the chest.    ______________________________________ Electronically signed by: Martín Oliveira M.D. Date:     10/09/2023 Time:    09:16         Assessment and Plan     Lower GI bleed. Follow serial H&H. Transfuse as needed. Fluid resuscitation. GI evaluation. Please document 40 minutes of critical care time for patient assessment, chart review, discussion with staff, .       Martinez Villanueva DO

## 2023-10-31 ENCOUNTER — APPOINTMENT (OUTPATIENT)
Dept: CT IMAGING | Age: 48
DRG: 844 | End: 2023-10-31
Payer: COMMERCIAL

## 2023-10-31 LAB
ALBUMIN SERPL-MCNC: 3.2 G/DL (ref 3.5–5.2)
ALP SERPL-CCNC: 64 U/L (ref 40–130)
ALT SERPL-CCNC: 14 U/L (ref 5–41)
ANION GAP SERPL CALCULATED.3IONS-SCNC: 10 MMOL/L (ref 7–19)
AST SERPL-CCNC: 12 U/L (ref 5–40)
BASOPHILS # BLD: 0 K/UL (ref 0–0.2)
BASOPHILS NFR BLD: 0.4 % (ref 0–1)
BILIRUB SERPL-MCNC: 0.9 MG/DL (ref 0.2–1.2)
BILIRUB UR QL STRIP: NEGATIVE
BUN SERPL-MCNC: 16 MG/DL (ref 6–20)
CALCIUM SERPL-MCNC: 7.8 MG/DL (ref 8.6–10)
CHLORIDE SERPL-SCNC: 106 MMOL/L (ref 98–111)
CLARITY UR: CLEAR
CO2 SERPL-SCNC: 23 MMOL/L (ref 22–29)
COLOR UR: YELLOW
CREAT SERPL-MCNC: 0.9 MG/DL (ref 0.5–1.2)
EOSINOPHIL # BLD: 0.1 K/UL (ref 0–0.6)
EOSINOPHIL NFR BLD: 1.1 % (ref 0–5)
ERYTHROCYTE [DISTWIDTH] IN BLOOD BY AUTOMATED COUNT: 15.5 % (ref 11.5–14.5)
GLUCOSE SERPL-MCNC: 106 MG/DL (ref 74–109)
GLUCOSE UR STRIP.AUTO-MCNC: NEGATIVE MG/DL
HCT VFR BLD AUTO: 35 % (ref 42–52)
HGB BLD-MCNC: 11.8 G/DL (ref 14–18)
HGB UR STRIP.AUTO-MCNC: NEGATIVE MG/L
IMM GRANULOCYTES # BLD: 0 K/UL
KETONES UR STRIP.AUTO-MCNC: ABNORMAL MG/DL
LACTATE BLDV-SCNC: 0.8 MG/DL (ref 0.5–1.9)
LEUKOCYTE ESTERASE UR QL STRIP.AUTO: NEGATIVE
LYMPHOCYTES # BLD: 2.6 K/UL (ref 1.1–4.5)
LYMPHOCYTES NFR BLD: 31.3 % (ref 20–40)
MCH RBC QN AUTO: 29.6 PG (ref 27–31)
MCHC RBC AUTO-ENTMCNC: 33.7 G/DL (ref 33–37)
MCV RBC AUTO: 87.7 FL (ref 80–94)
MONOCYTES # BLD: 0.5 K/UL (ref 0–0.9)
MONOCYTES NFR BLD: 5.8 % (ref 0–10)
NEUTROPHILS # BLD: 5.1 K/UL (ref 1.5–7.5)
NEUTS SEG NFR BLD: 61.2 % (ref 50–65)
NITRITE UR QL STRIP.AUTO: NEGATIVE
PH UR STRIP.AUTO: 7.5 [PH] (ref 5–8)
PLATELET # BLD AUTO: 218 K/UL (ref 130–400)
PMV BLD AUTO: 9.8 FL (ref 9.4–12.4)
POTASSIUM SERPL-SCNC: 4 MMOL/L (ref 3.5–5)
PROT SERPL-MCNC: 5 G/DL (ref 6.6–8.7)
PROT UR STRIP.AUTO-MCNC: NEGATIVE MG/DL
RBC # BLD AUTO: 3.99 M/UL (ref 4.7–6.1)
SODIUM SERPL-SCNC: 139 MMOL/L (ref 136–145)
SP GR UR STRIP.AUTO: 1.03 (ref 1–1.03)
UROBILINOGEN UR STRIP.AUTO-MCNC: 1 E.U./DL
WBC # BLD AUTO: 8.3 K/UL (ref 4.8–10.8)

## 2023-10-31 PROCEDURE — 36415 COLL VENOUS BLD VENIPUNCTURE: CPT

## 2023-10-31 PROCEDURE — 6360000002 HC RX W HCPCS: Performed by: INTERNAL MEDICINE

## 2023-10-31 PROCEDURE — 2580000003 HC RX 258: Performed by: INTERNAL MEDICINE

## 2023-10-31 PROCEDURE — 85025 COMPLETE CBC W/AUTO DIFF WBC: CPT

## 2023-10-31 PROCEDURE — 99222 1ST HOSP IP/OBS MODERATE 55: CPT | Performed by: INTERNAL MEDICINE

## 2023-10-31 PROCEDURE — 81003 URINALYSIS AUTO W/O SCOPE: CPT

## 2023-10-31 PROCEDURE — 83605 ASSAY OF LACTIC ACID: CPT

## 2023-10-31 PROCEDURE — 6370000000 HC RX 637 (ALT 250 FOR IP): Performed by: INTERNAL MEDICINE

## 2023-10-31 PROCEDURE — 94760 N-INVAS EAR/PLS OXIMETRY 1: CPT

## 2023-10-31 PROCEDURE — 74176 CT ABD & PELVIS W/O CONTRAST: CPT

## 2023-10-31 PROCEDURE — 80053 COMPREHEN METABOLIC PANEL: CPT

## 2023-10-31 PROCEDURE — C9113 INJ PANTOPRAZOLE SODIUM, VIA: HCPCS | Performed by: INTERNAL MEDICINE

## 2023-10-31 PROCEDURE — 1210000000 HC MED SURG R&B

## 2023-10-31 RX ADMIN — HYDROCORTISONE: 25 CREAM TOPICAL at 20:55

## 2023-10-31 RX ADMIN — HYDROCORTISONE: 25 CREAM TOPICAL at 08:06

## 2023-10-31 RX ADMIN — PANTOPRAZOLE SODIUM 8 MG/HR: 40 INJECTION, POWDER, FOR SOLUTION INTRAVENOUS at 19:12

## 2023-10-31 RX ADMIN — PANTOPRAZOLE SODIUM 8 MG/HR: 40 INJECTION, POWDER, FOR SOLUTION INTRAVENOUS at 09:30

## 2023-10-31 RX ADMIN — SODIUM CHLORIDE: 9 INJECTION, SOLUTION INTRAVENOUS at 10:59

## 2023-10-31 RX ADMIN — SODIUM CHLORIDE, PRESERVATIVE FREE 10 ML: 5 INJECTION INTRAVENOUS at 21:31

## 2023-10-31 ASSESSMENT — ENCOUNTER SYMPTOMS
BACK PAIN: 0
VOICE CHANGE: 0
COLOR CHANGE: 0
VOMITING: 0
NAUSEA: 0
SHORTNESS OF BREATH: 0
CONSTIPATION: 0
DIARRHEA: 0

## 2023-10-31 NOTE — PLAN OF CARE
Problem: Discharge Planning  Goal: Discharge to home or other facility with appropriate resources  10/31/2023 1750 by Khang Jarrell RN  Outcome: Progressing  10/31/2023 0529 by Nidia Gonzalez RN  Outcome: Progressing     Problem: Safety - Adult  Goal: Free from fall injury  10/31/2023 1750 by Khang Jarrell RN  Outcome: Progressing  10/31/2023 0529 by Nidia Gonzalez RN  Outcome: Progressing     Problem: ABCDS Injury Assessment  Goal: Absence of physical injury  10/31/2023 1750 by Khang Jarrell RN  Outcome: Progressing  10/31/2023 0529 by Nidia Gonzalez RN  Outcome: Progressing

## 2023-10-31 NOTE — PROGRESS NOTES
PLEASE USE . 4EYES   PLEASE USE . 4EYES  4 Eyes Skin Assessment     NAME:  Verner Jester  YOB: 1975  MEDICAL RECORD NUMBER:  [id number]    The patient is being assessed for  Admission    I agree that at least one RN has performed a thorough Head to Toe Skin Assessment on the patient. ALL assessment sites listed below have been assessed. Areas assessed by both nurses:    Head, Face, Ears, Shoulders, Back, Chest, Arms, Elbows, Hands, Sacrum. Buttock, Coccyx, Ischium, and Legs. Feet and Heels        Does the Patient have a Wound?  No noted wound(s)       Chase Prevention initiated by RN: Yes  Wound Care Orders initiated by RN: No    Pressure Injury (Stage 3,4, Unstageable, DTI, NWPT, and Complex wounds) if present, place Wound referral order by RN under : No    New Ostomies, if present place, Ostomy referral order under : No     Nurse 1 eSignature: {Esignature:[id number]}    **SHARE this note so that the co-signing nurse can place an eSignature**    Nurse 2 eSignature: Electronically signed by Esther Trujillo RN on 10/31/23 at 12:18 AM CDT

## 2023-10-31 NOTE — PROGRESS NOTES
Patient back to room 736 from CT at 0755. Ambulated independently, no complaints of pain or discomfort.

## 2023-10-31 NOTE — CARE COORDINATION
Case Management Assessment  Initial Evaluation    Date/Time of Evaluation: 10/31/2023 11:22 AM  Assessment Completed by: Ginger Lubin RN    If patient is discharged prior to next notation, then this note serves as note for discharge by case management. Patient Name: Wanda Jain                   YOB: 1975  Diagnosis: Syncope and collapse [R55]  Acute GI bleeding [K92.2]  Lower GI bleed [K92.2]  Symptomatic anemia [D64.9]  Hypotension, unspecified hypotension type [I95.9]                   Date / Time: 10/30/2023  2:20 PM    Patient Admission Status: Inpatient   Readmission Risk (Low < 19, Mod (19-27), High > 27): Readmission Risk Score: 10.2    Current PCP: None, . (Inactive)  PCP verified by CM? Chart Reviewed: Yes      History Provided by:    Patient Orientation:      Patient Cognition:      Hospitalization in the last 30 days (Readmission):  Yes    If yes, Readmission Assessment in CM Navigator will be completed. Advance Directives:      Code Status: Full Code   Patient's Primary Decision Maker is:        Discharge Planning:    Patient lives with: Family Members Type of Home: House  Primary Care Giver:    Patient Support Systems include: [employer], Friends/Neighbors, Parent, Children, Family Members   Current Financial resources:    Current community resources:    Current services prior to admission: None            Current DME:              Type of Home Care services:  None    ADLS  Prior functional level:    Current functional level:      PT AM-PAC:   /24  OT AM-PAC:   /24    Family can provide assistance at DC: Would you like Case Management to discuss the discharge plan with any other family members/significant others, and if so, who?     Plans to Return to Present Housing:    Other Identified Issues/Barriers to RETURNING to current housing: none  Potential Assistance needed at discharge: N/A            Potential DME:    Patient expects to discharge to: [facility]

## 2023-10-31 NOTE — PLAN OF CARE
Problem: Discharge Planning  Goal: Discharge to home or other facility with appropriate resources  Outcome: Progressing     Problem: Safety - Adult  Goal: Free from fall injury  Outcome: Progressing     Problem: ABCDS Injury Assessment  Goal: Absence of physical injury  Outcome: Progressing   Electronically signed by Primo Izaguirre RN on 10/31/2023 at 5:29 AM

## 2023-10-31 NOTE — PROGRESS NOTES
Sheila Howe arrived to room # [id number]. Presented with: Lower GI bleed  Mental Status: Patient is oriented, alert, coherent, logical, thought processes intact, and able to concentrate and follow conversation. Vitals:    10/30/23 2116   BP:    Pulse: 85   Resp:    Temp:    SpO2:      Patient safety contract and falls prevention contract reviewed with patient Yes. Oriented Patient to room. Call light within reach. Yes.   Needs, issues or concerns expressed at this time: no.      Electronically signed by Aleta Alpers, RN on 10/30/2023 at 10:55 PM

## 2023-10-31 NOTE — PROGRESS NOTES
Doctor Evans Hoffman came to the unit (CCU) and spoke to this nurse in detail about the patient and the events that brought him to the hospital Electronically signed by Richard Gupta RN on 10/30/2023 at 10:53 PM

## 2023-10-31 NOTE — PROGRESS NOTES
4 Eyes Skin Assessment     NAME:  Cheryl De  YOB: 1975  MEDICAL RECORD NUMBER:  [id number]    The patient is being assessed for  Admission    I agree that at least one RN has performed a thorough Head to Toe Skin Assessment on the patient. ALL assessment sites listed below have been assessed. Areas assessed by both nurses:    Head, Face, Ears, Shoulders, Back, Chest, Arms, Elbows, Hands, Sacrum. Buttock, Coccyx, Ischium, Legs. Feet and Heels, and Under Medical Devices         Does the Patient have a Wound?  No noted wound(s)       Chase Prevention initiated by RN: No  Wound Care Orders initiated by RN: No    Pressure Injury (Stage 3,4, Unstageable, DTI, NWPT, and Complex wounds) if present, place Wound referral order by RN under : No    New Ostomies, if present place, Ostomy referral order under : No     Nurse 1 eSignature: Electronically signed by Neto Hemphill RN on 10/31/23 at 12:19 AM CDT    **SHARE this note so that the co-signing nurse can place an eSignature**    Nurse 2 eSignature: Electronically signed by Wilbert Eid RN on 10/31/23 at 12:20 AM CDT

## 2023-10-31 NOTE — CONSULTS
[facility], [address] [place] [address]                                  CONSULTATION    PATIENT NAME: Andres Arenas                        :        1975  MED REC NO:   [id number]                              ROOM:       [facility]  ACCOUNT NO:   [de-identified]                           ADMIT DATE: 10/30/2023  PROVIDER:     Neil Houes MD    CONSULT DATE:  10/31/2023    GI CONSULTATION    ASSESSMENT:  1. Acute GI bleed with passage of large volumes of bright red blood  rectally at work, then having a syncopal episode with hypotension in the  ED requiring IV fluid resuscitation and transfusion of 1 unit of packed  cells. Presentation is suspicious for diverticular bleed; however,  other etiologies of both possible upper or lower GI bleeding need to be  excluded. 2.  Recent non-STEMI followed by cardiac catheterization on 10/09/2023,  and then placed on maintenance Plavix and aspirin. RECOMMENDATIONS:  1. Advance diet to full liquids. 2.  Continue to withhold Plavix and monitor H and H.  3.  Tentatively plan to pursue EGD and colonoscopy later this week after  Plavix effect has diminished. Need to determine the origin of the  patient's sudden massive bleeding if possible and clear the GI tract so  that the patient can potentially resume anticoagulant therapy. HISTORY OF PRESENT ILLNESS:  This 78-year-old single male has no  background history of gastrointestinal illness, specifically denying any  prior GI bleeding or need for endoscopy or colonoscopy. The patient was  previously admitted with angina 3 weeks ago. There were positive  troponins establishing a diagnosis of non-STEMI. He underwent cardiac  catheterization on 10/09/2023, finding no occlusive coronary artery  disease needing stenting, but was felt appropriate to place the patient  on anticoagulant therapy with Plavix and aspirin.     The patient first noticed a small amount of Clear.  CARDIOVASCULAR:  No S3 or murmur. ABDOMEN:  No bruit over the abdomen. Normal bowel sounds. Minimal left  lower quadrant tenderness. No palpable abdominal mass. No  hepatosplenomegaly. The procedures of EGD and colonoscopy have been discussed with the  patient including indication, alternatives, and risks.         Mirian Hernández MD    D: 10/31/2023 11:17:07      T: 10/31/2023 11:26:19     [email or web address]  Job#: [id number]     Doc#: [id number]    CC:

## 2023-10-31 NOTE — PROGRESS NOTES
Hospitalist Progress Note    Patient:  Andres Arenas  YOB: 1975  Date of Service: 10/31/2023  MRN: [id number]   Acct: [de-identified]   Primary Care Physician: None, . (Inactive)  Advance Directive: Full Code  Admit Date: 10/30/2023       Hospital Day: 1  Referring Provider: Sravanthi Villanueva DO    Patient Seen, Chart, Consults, Notes, Labs, Radiology studies reviewed. Subjective:  Andres Arenas is a 46 y.o. male  whom we are following for blood loss anemia, lower GI bleed, transient hypotension. He is feeling significantly better. GI plans to proceed with colonoscopy. Hemoglobin is down to 11.8 g. Allergies:  Patient has no known allergies.     Medicines:  Current Facility-Administered Medications   Medication Dose Route Frequency Provider Last Rate Last Admin    pantoprazole (PROTONIX) 80 mg in sodium chloride 0.9 % 100 mL infusion  8 mg/hr IntraVENous Continuous Sravanthi Villanueva, DO 10 mL/hr at 10/31/23 0930 8 mg/hr at 10/31/23 0930    0.9 % sodium chloride infusion   IntraVENous PRN Sravanthi Villanueva DO        sodium chloride flush 0.9 % injection 10 mL  10 mL IntraVENous 2 times per day Sravanthi Villanueva DO   10 mL at 10/30/23 2115    sodium chloride flush 0.9 % injection 10 mL  10 mL IntraVENous PRN Sravanthi Villanueva, DO        0.9 % sodium chloride infusion   IntraVENous PRN Sravanthi Villanueva, DO        ondansetron (ZOFRAN-ODT) disintegrating tablet 4 mg  4 mg Oral Q8H PRN Sravanthi Villanueva DO        Or    ondansetron [facility]) injection 4 mg  4 mg IntraVENous Q6H PRN Sravanthi Villanueva, DO        magnesium hydroxide (MILK OF MAGNESIA) 400 MG/5ML suspension 30 mL  30 mL Oral Daily PRN Sravanthi Villanueva, DO        acetaminophen (TYLENOL) tablet 650 mg  650 mg Oral Q6H PRN Sravanthi Villanueva, DO        Or    acetaminophen (TYLENOL) suppository 650 mg  650 mg Rectal Q6H PRN Sravanthi Villanueva, DO        0.9 % sodium chloride infusion   IntraVENous Continuous Alonzo Rue, DO 75 mL/hr at 10/30/23 2151 New Bag at 10/30/23 2151    hydrocortisone (ANUSOL-HC) 2.5 % rectal cream   Rectal BID Casey Calderon MD   Given at 10/31/23 0806       Past Medical History:  Past Medical History:   Diagnosis Date    Anxiety     History of blood transfusion        Past Surgical History:  Past Surgical History:   Procedure Laterality Date    CARDIAC SURGERY      Heart cath-[facility]- 10/9/23    VENTRICULOPERITONEAL SHUNT         Family History  History reviewed. No pertinent family history. Social History  Social History     Socioeconomic History    Marital status: Single     Spouse name: Not on file    Number of children: Not on file    Years of education: Not on file    Highest education level: Not on file   Occupational History    Not on file   Tobacco Use    Smoking status: Never    Smokeless tobacco: Never   Vaping Use    Vaping Use: Never used   Substance and Sexual Activity    Alcohol use: Never    Drug use: Never    Sexual activity: Not on file   Other Topics Concern    Not on file   Social History Narrative    Not on file     Social Determinants of Health     Financial Resource Strain: Not on file   Food Insecurity: Not on file   Transportation Needs: Not on file   Physical Activity: Not on file   Stress: Not on file   Social Connections: Not on file   Intimate Partner Violence: Not on file   Housing Stability: Not on file         Review of Systems:    Review of Systems   Constitutional:  Negative for activity change and fever. HENT:  Negative for congestion and voice change. Eyes:  Negative for visual disturbance. Respiratory:  Negative for shortness of breath. Cardiovascular:  Negative for chest pain and leg swelling. Gastrointestinal:  Negative for constipation, diarrhea, nausea and vomiting. Endocrine: Negative for polyuria. Genitourinary:  Negative for difficulty urinating and dysuria. Musculoskeletal:  Negative for back pain and neck pain.

## 2023-11-01 PROBLEM — D69.1 ABNORMAL PLATELET AGGREGATION (HCC): Status: ACTIVE | Noted: 2023-11-01

## 2023-11-01 PROBLEM — K92.2 GI BLEED: Status: ACTIVE | Noted: 2023-10-30

## 2023-11-01 PROBLEM — D64.9 SEVERE ANEMIA: Status: ACTIVE | Noted: 2023-11-01

## 2023-11-01 PROBLEM — D64.9 SYMPTOMATIC ANEMIA: Status: ACTIVE | Noted: 2023-11-01

## 2023-11-01 LAB
ALBUMIN SERPL-MCNC: 2.8 G/DL (ref 3.5–5.2)
ALP SERPL-CCNC: 55 U/L (ref 40–130)
ALT SERPL-CCNC: 11 U/L (ref 5–41)
ANION GAP SERPL CALCULATED.3IONS-SCNC: 8 MMOL/L (ref 7–19)
AST SERPL-CCNC: 10 U/L (ref 5–40)
BASOPHILS # BLD: 0 K/UL (ref 0–0.2)
BASOPHILS NFR BLD: 0.4 % (ref 0–1)
BILIRUB SERPL-MCNC: 0.4 MG/DL (ref 0.2–1.2)
BLOOD BANK DISPENSE STATUS: NORMAL
BLOOD BANK PRODUCT CODE: NORMAL
BPU ID: NORMAL
BUN SERPL-MCNC: 12 MG/DL (ref 6–20)
CALCIUM SERPL-MCNC: 7.5 MG/DL (ref 8.6–10)
CHLORIDE SERPL-SCNC: 110 MMOL/L (ref 98–111)
CO2 SERPL-SCNC: 23 MMOL/L (ref 22–29)
CORTIS AM PEAK SERPL-MCNC: 6.8 UG/DL (ref 6.2–19.4)
CREAT SERPL-MCNC: 1 MG/DL (ref 0.5–1.2)
DESCRIPTION BLOOD BANK: NORMAL
EOSINOPHIL # BLD: 0.2 K/UL (ref 0–0.6)
EOSINOPHIL NFR BLD: 1.9 % (ref 0–5)
ERYTHROCYTE [DISTWIDTH] IN BLOOD BY AUTOMATED COUNT: 15.4 % (ref 11.5–14.5)
ERYTHROCYTE [DISTWIDTH] IN BLOOD BY AUTOMATED COUNT: 15.6 % (ref 11.5–14.5)
ERYTHROCYTE [DISTWIDTH] IN BLOOD BY AUTOMATED COUNT: 15.9 % (ref 11.5–14.5)
GLUCOSE SERPL-MCNC: 115 MG/DL (ref 74–109)
HCT VFR BLD AUTO: 22.7 % (ref 42–52)
HCT VFR BLD AUTO: 25.2 % (ref 42–52)
HCT VFR BLD AUTO: 28.7 % (ref 42–52)
HCT VFR BLD AUTO: 30.9 % (ref 42–52)
HCT VFR BLD AUTO: 34.4 % (ref 42–52)
HCT VFR BLD AUTO: 35.5 % (ref 42–52)
HGB BLD-MCNC: 10 G/DL (ref 14–18)
HGB BLD-MCNC: 10.8 G/DL (ref 14–18)
HGB BLD-MCNC: 11.6 G/DL (ref 14–18)
HGB BLD-MCNC: 7.6 G/DL (ref 14–18)
HGB BLD-MCNC: 8.2 G/DL (ref 14–18)
HGB BLD-MCNC: 9.6 G/DL (ref 14–18)
IMM GRANULOCYTES # BLD: 0 K/UL
LYMPHOCYTES # BLD: 2.4 K/UL (ref 1.1–4.5)
LYMPHOCYTES NFR BLD: 26.2 % (ref 20–40)
MAGNESIUM SERPL-MCNC: 1.8 MG/DL (ref 1.6–2.6)
MCH RBC QN AUTO: 29.3 PG (ref 27–31)
MCH RBC QN AUTO: 29.9 PG (ref 27–31)
MCH RBC QN AUTO: 30 PG (ref 27–31)
MCHC RBC AUTO-ENTMCNC: 31.4 G/DL (ref 33–37)
MCHC RBC AUTO-ENTMCNC: 32.5 G/DL (ref 33–37)
MCHC RBC AUTO-ENTMCNC: 32.7 G/DL (ref 33–37)
MCV RBC AUTO: 89.6 FL (ref 80–94)
MCV RBC AUTO: 92 FL (ref 80–94)
MCV RBC AUTO: 95.6 FL (ref 80–94)
MONOCYTES # BLD: 0.4 K/UL (ref 0–0.9)
MONOCYTES NFR BLD: 4 % (ref 0–10)
NEUTROPHILS # BLD: 6.1 K/UL (ref 1.5–7.5)
NEUTS SEG NFR BLD: 67.2 % (ref 50–65)
PHOSPHATE SERPL-MCNC: 2 MG/DL (ref 2.5–4.5)
PLATELET # BLD AUTO: 188 K/UL (ref 130–400)
PLATELET # BLD AUTO: 208 K/UL (ref 130–400)
PLATELET # BLD AUTO: 254 K/UL (ref 130–400)
PMV BLD AUTO: 10.1 FL (ref 9.4–12.4)
PMV BLD AUTO: 10.1 FL (ref 9.4–12.4)
PMV BLD AUTO: 9.6 FL (ref 9.4–12.4)
POTASSIUM SERPL-SCNC: 3.7 MMOL/L (ref 3.5–5)
PROT SERPL-MCNC: 4.6 G/DL (ref 6.6–8.7)
RBC # BLD AUTO: 2.74 M/UL (ref 4.7–6.1)
RBC # BLD AUTO: 3.6 M/UL (ref 4.7–6.1)
RBC # BLD AUTO: 3.96 M/UL (ref 4.7–6.1)
SODIUM SERPL-SCNC: 141 MMOL/L (ref 136–145)
TROPONIN T SERPL-MCNC: <0.01 NG/ML (ref 0–0.03)
WBC # BLD AUTO: 9 K/UL (ref 4.8–10.8)
WBC # BLD AUTO: 9.1 K/UL (ref 4.8–10.8)
WBC # BLD AUTO: 9.7 K/UL (ref 4.8–10.8)

## 2023-11-01 PROCEDURE — 80053 COMPREHEN METABOLIC PANEL: CPT

## 2023-11-01 PROCEDURE — 3609008300 HC SIGMOIDOSCOPY W/BIOPSY SINGLE/MULTIPLE: Performed by: INTERNAL MEDICINE

## 2023-11-01 PROCEDURE — 85018 HEMOGLOBIN: CPT

## 2023-11-01 PROCEDURE — 82533 TOTAL CORTISOL: CPT

## 2023-11-01 PROCEDURE — 85027 COMPLETE CBC AUTOMATED: CPT

## 2023-11-01 PROCEDURE — 2709999900 HC NON-CHARGEABLE SUPPLY: Performed by: INTERNAL MEDICINE

## 2023-11-01 PROCEDURE — 84100 ASSAY OF PHOSPHORUS: CPT

## 2023-11-01 PROCEDURE — 99223 1ST HOSP IP/OBS HIGH 75: CPT | Performed by: INTERNAL MEDICINE

## 2023-11-01 PROCEDURE — 51798 US URINE CAPACITY MEASURE: CPT

## 2023-11-01 PROCEDURE — 83735 ASSAY OF MAGNESIUM: CPT

## 2023-11-01 PROCEDURE — 30233R1 TRANSFUSION OF NONAUTOLOGOUS PLATELETS INTO PERIPHERAL VEIN, PERCUTANEOUS APPROACH: ICD-10-PCS | Performed by: INTERNAL MEDICINE

## 2023-11-01 PROCEDURE — 84484 ASSAY OF TROPONIN QUANT: CPT

## 2023-11-01 PROCEDURE — 2580000003 HC RX 258: Performed by: INTERNAL MEDICINE

## 2023-11-01 PROCEDURE — 45331 SIGMOIDOSCOPY AND BIOPSY: CPT | Performed by: INTERNAL MEDICINE

## 2023-11-01 PROCEDURE — 85245 CLOT FACTOR VIII VW RISTOCTN: CPT

## 2023-11-01 PROCEDURE — 36415 COLL VENOUS BLD VENIPUNCTURE: CPT

## 2023-11-01 PROCEDURE — 6360000002 HC RX W HCPCS: Performed by: INTERNAL MEDICINE

## 2023-11-01 PROCEDURE — 85014 HEMATOCRIT: CPT

## 2023-11-01 PROCEDURE — 36430 TRANSFUSION BLD/BLD COMPNT: CPT

## 2023-11-01 PROCEDURE — 85240 CLOT FACTOR VIII AHG 1 STAGE: CPT

## 2023-11-01 PROCEDURE — 99233 SBSQ HOSP IP/OBS HIGH 50: CPT | Performed by: INTERNAL MEDICINE

## 2023-11-01 PROCEDURE — 2000000000 HC ICU R&B

## 2023-11-01 PROCEDURE — 85246 CLOT FACTOR VIII VW ANTIGEN: CPT

## 2023-11-01 PROCEDURE — 85025 COMPLETE CBC W/AUTO DIFF WBC: CPT

## 2023-11-01 PROCEDURE — 0DBP8ZX EXCISION OF RECTUM, VIA NATURAL OR ARTIFICIAL OPENING ENDOSCOPIC, DIAGNOSTIC: ICD-10-PCS | Performed by: INTERNAL MEDICINE

## 2023-11-01 PROCEDURE — C9113 INJ PANTOPRAZOLE SODIUM, VIA: HCPCS | Performed by: INTERNAL MEDICINE

## 2023-11-01 RX ORDER — SODIUM CHLORIDE 9 MG/ML
INJECTION, SOLUTION INTRAVENOUS PRN
Status: DISCONTINUED | OUTPATIENT
Start: 2023-11-01 | End: 2023-11-01 | Stop reason: ALTCHOICE

## 2023-11-01 RX ORDER — SODIUM CHLORIDE 9 MG/ML
INJECTION, SOLUTION INTRAVENOUS PRN
Status: DISCONTINUED | OUTPATIENT
Start: 2023-11-01 | End: 2023-11-02 | Stop reason: ALTCHOICE

## 2023-11-01 RX ORDER — SODIUM CHLORIDE 9 MG/ML
INJECTION, SOLUTION INTRAVENOUS CONTINUOUS
Status: DISCONTINUED | OUTPATIENT
Start: 2023-11-01 | End: 2023-11-06 | Stop reason: HOSPADM

## 2023-11-01 RX ADMIN — SODIUM CHLORIDE: 9 INJECTION, SOLUTION INTRAVENOUS at 14:57

## 2023-11-01 RX ADMIN — HYDROCORTISONE: 25 CREAM TOPICAL at 07:41

## 2023-11-01 RX ADMIN — PANTOPRAZOLE SODIUM 8 MG/HR: 40 INJECTION, POWDER, FOR SOLUTION INTRAVENOUS at 14:58

## 2023-11-01 RX ADMIN — METHYLPREDNISOLONE SODIUM SUCCINATE 25 MG: 40 INJECTION, POWDER, FOR SOLUTION INTRAMUSCULAR; INTRAVENOUS at 20:52

## 2023-11-01 RX ADMIN — SODIUM CHLORIDE: 9 INJECTION, SOLUTION INTRAVENOUS at 09:07

## 2023-11-01 RX ADMIN — PANTOPRAZOLE SODIUM 8 MG/HR: 40 INJECTION, POWDER, FOR SOLUTION INTRAVENOUS at 04:55

## 2023-11-01 ASSESSMENT — PAIN SCALES - GENERAL
PAINLEVEL_OUTOF10: 0
PAINLEVEL_OUTOF10: 0

## 2023-11-01 NOTE — PROGRESS NOTES
Physician Progress Note      PATIENT:               Rosa Mujica  CSN #:                  [id number]  :                       1975  ADMIT DATE:       10/30/2023 2:20 PM  [facility] DATE:  RESPONDING  PROVIDER #:        Beck Carrera [address] [place] [address]        QUERY TEXT:    Type of Anemia: Please provide further specificity, if known. Clinical indicators include: bleeding, bright red blood, bloody bowel   movements, gi bleed, blood in stool, rbc, hemoglobin, hematocrit, 1 unit,   prbc, transfusion, anemia, unit prbc transfusion, gi bleeding  Options provided:  -- Anemia due to acute blood loss  -- Anemia due to chronic blood loss  -- Anemia due to iron deficiency  -- Anemia due to postoperative blood loss  -- Anemia due to chronic disease  -- Other - I will add my own diagnosis  -- Disagree - Not applicable / Not valid  -- Disagree - Clinically Unable to determine / Unknown        PROVIDER RESPONSE TEXT:    The patient has anemia due to acute blood loss.       Electronically signed by:  Beck Carrera [address] [place] [address] 10/31/2023 8:01 PM

## 2023-11-01 NOTE — PROGRESS NOTES
Andres Arenas transferred to 147 from 514 via bed. Reason for transfer: Large amounts of bloody stool x multiple bowel movements, Blood pressure decreasing. Explained reason for transfer to Patient. Belongings:  Various belongings  with patient at bedside . Soft chart transferred with patient: Yes. Telemetry box transferred with patient: yes. Report given to: KARIME Aquino, at bedside.       Electronically signed by Edgardo Carrion RN on 11/1/2023 at 2:29 AM

## 2023-11-01 NOTE — PROGRESS NOTES
Second tap water enema given at 14:30. Output from each enema has been bright red with some clots noted. It is starting to run more clear.

## 2023-11-01 NOTE — PROGRESS NOTES
[place] GI bleeding resumed, 200 ml BRB on [place] so patient transferred to ICU for close monitoring, transient hypotension when bleeding, cardiopulmonary exam stable, no abdominal tenderness, no hemorrhoids or rectal mass of glove exam, hematochezia with fresh clots suggests lower GI source, Hgb 9.6 gms. Coagulation parameters and platelet count normal, but Plavix still causing platelet dysfunction with poor adherence. Hematology consult requested, consider Platelet transfusion, rule out Renee Ariel disease since patient was an orphan. Consider limited flexible sigmoidoscopy. Colonoscopy still deferred for now unless bleeding becomes life threatening. Consider CTA Abdomen. IR not available.

## 2023-11-01 NOTE — BRIEF OP NOTE
Brief Postoperative Note      Patient: Jeffry Schilling  YOB: 1975  MRN: [id number]    Date of Procedure: 11/1/2023    Pre-Op Diagnosis Codes:     * GI bleed [K92.2]    Post-Op Diagnosis: 3 cm fleshy friable polypoid mass at 10 cm in rectum, large volume of blood and clots partially removed by suction       Procedure(s):  SIGMOIDOSCOPY BIOPSY FLEXIBLE    Surgeon(s):  Booker Esposito MD    Assistant: Lavelle Coffman RN      Anesthesia: None    Estimated Blood Loss (mL): 746 ml    Complications: none    Specimens:   ID Type Source Tests Collected by Time Destination   A : polypoid mass @ 10cm in the rectum Tissue Colon SURGICAL PATHOLOGY Booker Esposito MD 11/1/2023 1551        Implants:  * No implants in log *      Drains:   Urinary Catheter 11/01/23 [place] (Active)   Catheter Indications Urinary retention (acute or chronic), continuous bladder irrigation or bladder outlet obstruction 11/01/23 1600   Site Assessment No urethral drainage 11/01/23 1600   Urine Color Yellow 11/01/23 1600   Urine Appearance Red flecks 11/01/23 1600   Collection Container Standard 11/01/23 1600   Securement Method Tape 11/01/23 1600   Catheter Care  Perineal wipes 11/01/23 1200   Catheter Best Practices  Drainage tube clipped to bed;Catheter secured to thigh; Tamper seal intact; Bag below bladder;Bag not on floor; Lack of dependent loop in tubing;Drainage bag less than half full 11/01/23 1600   Status Draining 11/01/23 1600   Output (mL) 500 mL 11/01/23 1800       Findings:  Ongoing lower GI bleed probably originating from rectal mass, higher levels of colon remain unseen for now.                                                                                                                                           Plan:  full liquid diet , nutritional supplement, transfuse pack cells for Hgb<7 gm, give platelet packs as needed while waiting Plavix effect to dissipate, check CEA, CT abdomen already done, tentatively schedule

## 2023-11-01 NOTE — PROGRESS NOTES
Hospitalist Progress Note  [facility]     Patient: Say Mathew  : 1975  MRN: [id number]  Code Status: Full Code    Hospital Day: 2   Date of Service: 2023    Subjective:   Patient seen and examined. No current complaints. Past Medical History:   Diagnosis Date    Anxiety     History of blood transfusion        Past Surgical History:   Procedure Laterality Date    CARDIAC SURGERY      Heart cath-[facility]- 10/9/23    VENTRICULOPERITONEAL SHUNT         History reviewed. No pertinent family history.     Social History     Socioeconomic History    Marital status: Single     Spouse name: Not on file    Number of children: Not on file    Years of education: Not on file    Highest education level: Not on file   Occupational History    Not on file   Tobacco Use    Smoking status: Never    Smokeless tobacco: Never   Vaping Use    Vaping Use: Never used   Substance and Sexual Activity    Alcohol use: Never    Drug use: Never    Sexual activity: Not on file   Other Topics Concern    Not on file   Social History Narrative    Not on file     Social Determinants of Health     Financial Resource Strain: Not on file   Food Insecurity: Not on file   Transportation Needs: Not on file   Physical Activity: Not on file   Stress: Not on file   Social Connections: Not on file   Intimate Partner Violence: Not on file   Housing Stability: Not on file       Current Facility-Administered Medications   Medication Dose Route Frequency Provider Last Rate Last Admin    0.9 % sodium chloride infusion   IntraVENous Continuous ScowcroftGisele  mL/hr at 23 0907 New Bag at 23 0907    0.9 % sodium chloride infusion   IntraVENous PRN Souleymane MICHEL MD        pantoprazole (PROTONIX) 80 mg in sodium chloride 0.9 % 100 mL infusion  8 mg/hr IntraVENous Continuous Luma Dine, DO 10 mL/hr at 23 0455 8 mg/hr at 23 0455    sodium chloride flush 0.9 % injection 10 mL  10 mL

## 2023-11-01 NOTE — PROGRESS NOTES
At 31 75 62- Patient transferred from . Received report from Sai RN @ 4545 Vital signs stable, patient stable without any s/s. NS @ 75 with protonix gtt infusing. At [address], patient called out and reported a bloody stool. This nurse assessed stool. Documented. Patient stated he had not had any bloody stools since he was in ER. At 0012, patient called out with another bloody stool. This nurse assessed and documented. 0018-BP- 112/75, HR-81, R-18.    0022- Notified Dr. Kylee Andrade- Patient with bright blood stool. Hat placed in toiled for more accurate measurement and assessing of clots. 9318- Left voicemail with Dr. Negron    8815- Received call back from Dr. Negron- updated Dr. Negron, on patients status, vital signs, concerns, received orders to notify GI on case, and for labs. 5893- Message left with Dr. Samir Caballero answering service. 36- House supervisor present to review case and at bedside with patient. Charge nurse updated on Patient status. 0044- Tachycardia 141- reported by Tele. Room. Patient checked immediately, Patient in bathroom. Remained with patient until return to bed. 0046- 200mLs of Bright red bloody rectal output, with several clots present. 0046-BP-124/92, HR-97, R-18    0104-Message left with Dr. Samir Caballero answering service. 26- Dr. Andi Singh returned call- reviewed labs (H&H, PT, PTT, INR, platelets). Reviewed patients chart and events from admission to present. Approx. 20 min. . Discussed Plavix use and reason unable to completed scope at this time. Received orders for repeat H&H, transfer to ICU, increase Normal Saline. Notify  of updates. 0139- 300 mLs of bright red bloody output with multiple clots, from dime size to quarter size, patient c/o not feeling well, dizzy, and light headed.      0141-BP-96/66, HR-74, R-18    0145- Transferred to - bedside transfer report given to Baylee/Joshua SCHAFFER.     Laya Vázquez with Dr. Negron per request of

## 2023-11-01 NOTE — CONSULTS
performed using dose optimization techniques as appropriate to the performed exam and include at least one of the following: Automated exposure control, adjustment of the mA and/or kV according to size, and the use of iterative reconstruction technique. ______________________________________ Electronically signed by: Zeynep Feliz M.D. Date:     10/31/2023 Time:    08:01     CTA PULMONARY W CONTRAST    Result Date: 10/9/2023  EXAM:  CT ANGIOGRAM CHEST. HISTORY:  Chest pain. Elevated D-dimer. COMPARISON:  Radiograph earlier the same day. TECHNIQUE:  Multiple axial images of the chest were obtained following intravenous administration of 75 mL Isovue 370, low osmolar. Images were reformatted in the sagittal and coronal plane. 3-D and maximum intensity projection reformatted images were created on an independent workstation. FINDINGS:  Heart size normal.  No pericardial effusion. No pulmonary arterial filling defect is seen. No lymphadenopathy detected. No consolidation, pleural effusion or pneumothorax identified. No acute abnormality is seen within the upper abdomen. No acute osseous abnormality is detected.  ------------------------------     No pulmonary embolus or other acute cardiopulmonary process. ---------------------------  All CT scans are performed using dose optimization techniques as appropriate to the performed exam and include at least one of the following: Automated exposure control, adjustment of the mA and/or kV according to size, and the use of iterative reconstruction technique. ______________________________________ Electronically signed by: Loretta Moscoso M.D. Date:     10/09/2023 Time:    09:25     XR CHEST PORTABLE    Result Date: 10/9/2023  EXAM: CHEST RADIOGRAPH  TECHNIQUE: Single frontal chest radiograph. HISTORY: Chest pain. COMPARISON: None. FINDINGS:  All the patient is mildly leaning to the right.   EKG leads project over the chest.  A ventriculoperitoneal shunt catheter

## 2023-11-01 NOTE — PROGRESS NOTES
Spoke with Dr. Luciano Orta on the phone. He explained we will continue to monitor bowel movements for blood. He has ordered q4 Hemoglobin & Hematocrit labs starting at 0600. Will continue to keep patient NPO until morning in case of need for emergency intervention.

## 2023-11-01 NOTE — PROGRESS NOTES
Report called to nurse Yoko Rowland on 5th floor at [phone]. Patient transported via wheelchair at [phone]. All belongings sent with patient.

## 2023-11-02 PROBLEM — K92.2 GI BLEED: Status: ACTIVE | Noted: 2023-10-30

## 2023-11-02 LAB
ALBUMIN SERPL-MCNC: 2.8 G/DL (ref 3.5–5.2)
ALP SERPL-CCNC: 49 U/L (ref 40–130)
ALT SERPL-CCNC: 9 U/L (ref 5–41)
ANION GAP SERPL CALCULATED.3IONS-SCNC: 11 MMOL/L (ref 7–19)
AST SERPL-CCNC: 9 U/L (ref 5–40)
BASOPHILS # BLD: 0 K/UL (ref 0–0.2)
BASOPHILS NFR BLD: 0.4 % (ref 0–1)
BILIRUB SERPL-MCNC: 0.3 MG/DL (ref 0.2–1.2)
BLOOD BANK DISPENSE STATUS: NORMAL
BLOOD BANK DISPENSE STATUS: NORMAL
BLOOD BANK PRODUCT CODE: NORMAL
BLOOD BANK PRODUCT CODE: NORMAL
BPU ID: NORMAL
BPU ID: NORMAL
BUN SERPL-MCNC: 16 MG/DL (ref 6–20)
CALCIUM SERPL-MCNC: 7.2 MG/DL (ref 8.6–10)
CEA SERPL-MCNC: 0.6 NG/ML (ref 0–4.7)
CHLORIDE SERPL-SCNC: 109 MMOL/L (ref 98–111)
CO2 SERPL-SCNC: 20 MMOL/L (ref 22–29)
CREAT SERPL-MCNC: 0.9 MG/DL (ref 0.5–1.2)
DESCRIPTION BLOOD BANK: NORMAL
DESCRIPTION BLOOD BANK: NORMAL
EOSINOPHIL # BLD: 0 K/UL (ref 0–0.6)
EOSINOPHIL NFR BLD: 0.4 % (ref 0–5)
ERYTHROCYTE [DISTWIDTH] IN BLOOD BY AUTOMATED COUNT: 15 % (ref 11.5–14.5)
ERYTHROCYTE [DISTWIDTH] IN BLOOD BY AUTOMATED COUNT: 15.2 % (ref 11.5–14.5)
GLUCOSE SERPL-MCNC: 103 MG/DL (ref 74–109)
HCT VFR BLD AUTO: 22.4 % (ref 42–52)
HCT VFR BLD AUTO: 22.4 % (ref 42–52)
HCT VFR BLD AUTO: 22.6 % (ref 42–52)
HCT VFR BLD AUTO: 23.3 % (ref 42–52)
HCT VFR BLD AUTO: 23.9 % (ref 42–52)
HCT VFR BLD AUTO: 24.4 % (ref 42–52)
HGB BLD-MCNC: 7.4 G/DL (ref 14–18)
HGB BLD-MCNC: 7.5 G/DL (ref 14–18)
HGB BLD-MCNC: 7.5 G/DL (ref 14–18)
HGB BLD-MCNC: 7.7 G/DL (ref 14–18)
HGB BLD-MCNC: 7.8 G/DL (ref 14–18)
HGB BLD-MCNC: 8 G/DL (ref 14–18)
IMM GRANULOCYTES # BLD: 0.1 K/UL
LYMPHOCYTES # BLD: 1.2 K/UL (ref 1.1–4.5)
LYMPHOCYTES NFR BLD: 13.8 % (ref 20–40)
MAGNESIUM SERPL-MCNC: 1.6 MG/DL (ref 1.6–2.6)
MCH RBC QN AUTO: 29.6 PG (ref 27–31)
MCH RBC QN AUTO: 29.7 PG (ref 27–31)
MCHC RBC AUTO-ENTMCNC: 32.6 G/DL (ref 33–37)
MCHC RBC AUTO-ENTMCNC: 32.7 G/DL (ref 33–37)
MCV RBC AUTO: 90.4 FL (ref 80–94)
MCV RBC AUTO: 90.9 FL (ref 80–94)
MONOCYTES # BLD: 0.1 K/UL (ref 0–0.9)
MONOCYTES NFR BLD: 1.2 % (ref 0–10)
NEUTROPHILS # BLD: 7 K/UL (ref 1.5–7.5)
NEUTS SEG NFR BLD: 83.6 % (ref 50–65)
PLATELET # BLD AUTO: 201 K/UL (ref 130–400)
PLATELET # BLD AUTO: 223 K/UL (ref 130–400)
PMV BLD AUTO: 10.3 FL (ref 9.4–12.4)
PMV BLD AUTO: 9.4 FL (ref 9.4–12.4)
POTASSIUM SERPL-SCNC: 3.7 MMOL/L (ref 3.5–5)
PROT SERPL-MCNC: 4.1 G/DL (ref 6.6–8.7)
RBC # BLD AUTO: 2.5 M/UL (ref 4.7–6.1)
RBC # BLD AUTO: 2.63 M/UL (ref 4.7–6.1)
SODIUM SERPL-SCNC: 140 MMOL/L (ref 136–145)
WBC # BLD AUTO: 7.6 K/UL (ref 4.8–10.8)
WBC # BLD AUTO: 8.4 K/UL (ref 4.8–10.8)

## 2023-11-02 PROCEDURE — 6360000002 HC RX W HCPCS: Performed by: INTERNAL MEDICINE

## 2023-11-02 PROCEDURE — 2580000003 HC RX 258: Performed by: INTERNAL MEDICINE

## 2023-11-02 PROCEDURE — 85027 COMPLETE CBC AUTOMATED: CPT

## 2023-11-02 PROCEDURE — 6370000000 HC RX 637 (ALT 250 FOR IP): Performed by: INTERNAL MEDICINE

## 2023-11-02 PROCEDURE — 2000000000 HC ICU R&B

## 2023-11-02 PROCEDURE — 85025 COMPLETE CBC W/AUTO DIFF WBC: CPT

## 2023-11-02 PROCEDURE — 2580000003 HC RX 258: Performed by: HOSPITALIST

## 2023-11-02 PROCEDURE — C9113 INJ PANTOPRAZOLE SODIUM, VIA: HCPCS | Performed by: INTERNAL MEDICINE

## 2023-11-02 PROCEDURE — 82378 CARCINOEMBRYONIC ANTIGEN: CPT

## 2023-11-02 PROCEDURE — 99232 SBSQ HOSP IP/OBS MODERATE 35: CPT | Performed by: INTERNAL MEDICINE

## 2023-11-02 PROCEDURE — 83735 ASSAY OF MAGNESIUM: CPT

## 2023-11-02 PROCEDURE — 85018 HEMOGLOBIN: CPT

## 2023-11-02 PROCEDURE — 36430 TRANSFUSION BLD/BLD COMPNT: CPT

## 2023-11-02 PROCEDURE — 85014 HEMATOCRIT: CPT

## 2023-11-02 PROCEDURE — 80053 COMPREHEN METABOLIC PANEL: CPT

## 2023-11-02 PROCEDURE — 36415 COLL VENOUS BLD VENIPUNCTURE: CPT

## 2023-11-02 RX ORDER — SODIUM CHLORIDE 9 MG/ML
INJECTION, SOLUTION INTRAVENOUS PRN
Status: DISCONTINUED | OUTPATIENT
Start: 2023-11-02 | End: 2023-11-03 | Stop reason: ALTCHOICE

## 2023-11-02 RX ORDER — 0.9 % SODIUM CHLORIDE 0.9 %
1000 INTRAVENOUS SOLUTION INTRAVENOUS ONCE
Status: COMPLETED | OUTPATIENT
Start: 2023-11-02 | End: 2023-11-02

## 2023-11-02 RX ORDER — POLYETHYLENE GLYCOL 3350 17 G/17G
152 POWDER, FOR SOLUTION ORAL ONCE
Status: COMPLETED | OUTPATIENT
Start: 2023-11-02 | End: 2023-11-02

## 2023-11-02 RX ADMIN — ACETAMINOPHEN 650 MG: 325 TABLET ORAL at 16:34

## 2023-11-02 RX ADMIN — SODIUM CHLORIDE: 9 INJECTION, SOLUTION INTRAVENOUS at 23:57

## 2023-11-02 RX ADMIN — PANTOPRAZOLE SODIUM 8 MG/HR: 40 INJECTION, POWDER, FOR SOLUTION INTRAVENOUS at 10:48

## 2023-11-02 RX ADMIN — SODIUM CHLORIDE: 9 INJECTION, SOLUTION INTRAVENOUS at 00:03

## 2023-11-02 RX ADMIN — HYDROCORTISONE: 25 CREAM TOPICAL at 00:29

## 2023-11-02 RX ADMIN — SODIUM CHLORIDE 1000 ML: 9 INJECTION, SOLUTION INTRAVENOUS at 01:15

## 2023-11-02 RX ADMIN — POLYETHYLENE GLYCOL 3350 152 G: 17 POWDER, FOR SOLUTION ORAL at 15:14

## 2023-11-02 RX ADMIN — PANTOPRAZOLE SODIUM 8 MG/HR: 40 INJECTION, POWDER, FOR SOLUTION INTRAVENOUS at 00:03

## 2023-11-02 RX ADMIN — PANTOPRAZOLE SODIUM 8 MG/HR: 40 INJECTION, POWDER, FOR SOLUTION INTRAVENOUS at 19:56

## 2023-11-02 ASSESSMENT — PAIN SCALES - GENERAL
PAINLEVEL_OUTOF10: 8
PAINLEVEL_OUTOF10: 2
PAINLEVEL_OUTOF10: 0

## 2023-11-02 ASSESSMENT — PAIN DESCRIPTION - LOCATION: LOCATION: HEAD

## 2023-11-02 ASSESSMENT — PAIN - FUNCTIONAL ASSESSMENT: PAIN_FUNCTIONAL_ASSESSMENT: ACTIVITIES ARE NOT PREVENTED

## 2023-11-02 ASSESSMENT — PAIN DESCRIPTION - ORIENTATION: ORIENTATION: MID

## 2023-11-02 ASSESSMENT — PAIN DESCRIPTION - DESCRIPTORS: DESCRIPTORS: ACHING

## 2023-11-02 NOTE — PLAN OF CARE
Problem: Discharge Planning  Goal: Discharge to home or other facility with appropriate resources  Outcome: Progressing  Flowsheets (Taken 11/2/2023 0800)  Discharge to home or other facility with appropriate resources: Identify barriers to discharge with patient and caregiver     Problem: Safety - Adult  Goal: Free from fall injury  Outcome: Progressing  Flowsheets (Taken 11/2/2023 1200)  Free From Fall Injury: Instruct family/caregiver on patient safety     Problem: ABCDS Injury Assessment  Goal: Absence of physical injury  Outcome: Progressing     Problem: Skin/Tissue Integrity  Goal: Absence of new skin breakdown  Description: 1. Monitor for areas of redness and/or skin breakdown  2. Assess vascular access sites hourly  3. Every 4-6 hours minimum:  Change oxygen saturation probe site  4. Every 4-6 hours:  If on nasal continuous positive airway pressure, respiratory therapy assess nares and determine need for appliance change or resting period.   Outcome: Progressing

## 2023-11-02 NOTE — OP NOTE
[employer] [address], [address] [place] [address]                                OPERATIVE REPORT    PATIENT NAME: Rene Eric                        :        1975  MED REC NO:   [id number]                              ROOM:       [facility]  ACCOUNT NO:   [de-identified]                           ADMIT DATE: 10/30/2023  PROVIDER:     Dwayne De Leon MD    DATE OF PROCEDURE:  2023    PROCEDURE PERFORMED:  Limited flexible sigmoidoscopy with biopsies. INDICATION:  This is a 70-year-old male has experienced a major GI  bleed, which has continued despite withholding Plavix which the patient  had been taking for coronary artery disease, having had a non-STEMI  three weeks ago. Limited flexible sigmoidoscopy is intended to view the  rectosigmoid to ascertain if there is a source of bleeding distally  since there was passage of such a large volume of bright red blood  rectally. PREMEDICATION:  None. PROCEDURE:  Inspection of the external anal orifice was normal.  There  were no internal or external hemorrhoids. The examining gloved finger  swept the walls of the distal most rectum with no abnormality palpable. The Olympus scope was inserted into the rectum and air insufflation  completed. There was a large amount of fresh blood along with clots,  which required additional lavage and suctioning to improve the view. The scope was then slowly able to be advanced to a distance of 30 cm. There were no obvious sigmoid diverticula. The scope was then slowly  withdrawn, discovering a 3 cm fleshy friable polypoid mass at 10 cm in  the rectum. The lesion was photographed. Two standard biopsies were  obtained. The bowel was then deflated and the scope removed. The  patient tolerated the procedure well. Estimated blood loss, 300 mL. FINDINGS:  Ongoing lower GI bleeding, probably originating from polypoid  rectal mass.   Higher levels of the colon

## 2023-11-02 NOTE — PROGRESS NOTES
Hospitalist Progress Note  [facility]     Patient: Manuela Hartmann  : 1975  MRN: [id number]  Code Status: Full Code    Hospital Day: 3   Date of Service: 2023    Subjective:   Patient seen and examined. No current complaints. Past Medical History:   Diagnosis Date    Anxiety     History of blood transfusion        Past Surgical History:   Procedure Laterality Date    CARDIAC SURGERY      Heart cath-Glaser- 10/9/23    SIGMOIDOSCOPY N/A 2023    Dr Huang Her lower GI bleeding, probably originating from polypoid rectal mass, higher levels of the colon remain unseen for now    VENTRICULOPERITONEAL SHUNT         History reviewed. No pertinent family history.     Social History     Socioeconomic History    Marital status: Single     Spouse name: Not on file    Number of children: Not on file    Years of education: Not on file    Highest education level: Not on file   Occupational History    Not on file   Tobacco Use    Smoking status: Never    Smokeless tobacco: Never   Vaping Use    Vaping Use: Never used   Substance and Sexual Activity    Alcohol use: Never    Drug use: Never    Sexual activity: Not on file   Other Topics Concern    Not on file   Social History Narrative    Not on file     Social Determinants of Health     Financial Resource Strain: Not on file   Food Insecurity: Not on file   Transportation Needs: Not on file   Physical Activity: Not on file   Stress: Not on file   Social Connections: Not on file   Intimate Partner Violence: Not on file   Housing Stability: Not on file       Current Facility-Administered Medications   Medication Dose Route Frequency Provider Last Rate Last Admin    0.9 % sodium chloride infusion   IntraVENous PRN Robbi Diaz MD        0.9 % sodium chloride infusion   IntraVENous Continuous Scowcroft, Amy Zepeda  mL/hr at 23 0003 New Bag at 23 0003    potassium phosphate 10 mmol in sodium chloride 0.9 % 250 mL IVPB 92.0  --  90.9  --  90.4  --    MCH 29.9  --  29.7  --  29.6  --    MCHC 32.5*  --  32.6*  --  32.7*  --      --  201  --  223  --     < > = values in this interval not displayed. Recent Labs     10/31/23  0154 11/01/23  0159 11/02/23  0122    141 140   K 4.0 3.7 3.7   ANIONGAP 10 8 11    110 109   CO2 23 23 20*   BUN 16 12 16   CREATININE 0.9 1.0 0.9   GLUCOSE 106 115* 103   CALCIUM 7.8* 7.5* 7.2*     Recent Labs     11/01/23 0159 11/02/23 0122   MG 1.8 1.6   PHOS 2.0*  --      Recent Labs     10/31/23  0154 11/01/23  0159 11/02/23  0122   AST 12 10 9   ALT 14 11 9   BILITOT 0.9 0.4 0.3   ALKPHOS 64 55 49     No results for input(s): \"PH\", \"PO2\", \"PCO2\", \"HCO3\", \"BE\", \"O2SAT\" in the last 72 hours. Recent Labs     11/01/23  0047   TROPONINI <0.01     No results for input(s): \"INR\" in the last 72 hours. No results for input(s): \"LACTA\" in the last 72 hours. Intake/Output Summary (Last 24 hours) at 11/2/2023 2042  Last data filed at 11/2/2023 1800  Gross per 24 hour   Intake 5730.03 ml   Output 3250 ml   Net 2480.03 ml       Colonoscopy    Result Date: 11/1/2023  No dictation      Assessment and Plan:   58-year-old male on aspirin and Plavix admitted to critical care unit for acute blood loss anemia secondary to GI bleed. GI following  Heme-onc following  Awaiting pathology from rectal mass biopsy  CEA 0.6  Further transfusions as warranted  Follow hemoglobin  Aspirin/Plavix remain on hold  SCDs for DVT prophylaxis  Discussed treatment plan with patient/RN/heme-onc     Total critical care time: 41 minutes    The above note was generated using voice recognition software. Inadvertent typographical errors in transcription may have occurred.     Rayshawn Whitman MD   11/2/2023 8:42 PM

## 2023-11-02 NOTE — PROGRESS NOTES
Signs and symptoms of GI bleeding subsided while withholding Plavix and given Platelet transfusions because of platelet dysfunction, hypotension improving, Hgb stabilized at 7.5 gms this AM, abdomen non-tender, rectal tumor at 10 cm, differential dx includes large villous adenoma, hamartoma or adeno CA, CEA 0.6 favorable, await tissue report, scheduled for EGD and full colonoscopy tomorrow. Findings and plan fully discussed with patient.

## 2023-11-02 NOTE — PROGRESS NOTES
MEDICAL ONCOLOGY PROGRESS NOTE    Pt Name: Kody Ramirez  MRN: [id number]  YOB: 1975  Date of evaluation: 11/2/2023    Subjective-No new complains. However, CBC showed significant drop in hemoglobin. HISTORY OF PRESENT ILLNESS:  Kody Ramirez was first seen by me on 11/1/2023. I was consulted during patient hospitalization in the MICU. I was consulted by GI due to concern of von Willebrand disease. The patient presented to the ER department with complaints of hematochezia. He experienced a syncopal episode. He was hypotensive in the emergency and diaphoretic. He received 1 unit PRBC and was admitted to the MICU unit. Of note, the patient has a history of coronary artery disease and underwent left side heart catheterization. He was started on aspirin and Plavix. His antiplatelet therapy are being held. The patient was seen by GI. He has frequent GI bleed. I was consulted to help with management of his GI bleed. A colonoscopy will be performed when safe. CBC in the emergency showed hemoglobin 11.8. He received 1 unit PRBC. Platelets were normal.  PT and APTT were normal.  10/31/2023-CT abdomen pelvis without contrast remarkable for:FINDINGS:  There is dependent atelectasis. Evaluation is limited due to lack of contrast.  The liver is unremarkable. The gallbladder is minimally distended. Adrenal glands are unremarkable. Kidneys are unremarkable. Spleen is unremarkable. The pancreas is unremarkable. The stomach is minimally distended. The small bowel in the abdomen and pelvis is unremarkable. Tubing extends into the abdomen terminating inferior pelvis. The colon is unremarkable in appearance with no acute blood noted internally. There is no free air, free fluid or lymphadenopathy. Prostate is normal.  Urinary bladder is partially distended. There is no lymphadenopathy. There is a fat-containing umbilical hernia. There is minimal degenerative change.   11/1/2023-flexible

## 2023-11-03 ENCOUNTER — ANESTHESIA (OUTPATIENT)
Dept: ENDOSCOPY | Age: 48
End: 2023-11-03
Payer: COMMERCIAL

## 2023-11-03 ENCOUNTER — ANESTHESIA EVENT (OUTPATIENT)
Dept: ENDOSCOPY | Age: 48
End: 2023-11-03
Payer: COMMERCIAL

## 2023-11-03 LAB
ABO/RH: NORMAL
ANION GAP SERPL CALCULATED.3IONS-SCNC: 7 MMOL/L (ref 7–19)
ANTIBODY SCREEN: NORMAL
BASOPHILS # BLD: 0 K/UL (ref 0–0.2)
BASOPHILS NFR BLD: 0.8 % (ref 0–1)
BLOOD BANK DISPENSE STATUS: NORMAL
BLOOD BANK DISPENSE STATUS: NORMAL
BLOOD BANK PRODUCT CODE: NORMAL
BLOOD BANK PRODUCT CODE: NORMAL
BPU ID: NORMAL
BPU ID: NORMAL
BUN SERPL-MCNC: 12 MG/DL (ref 6–20)
CALCIUM SERPL-MCNC: 7.1 MG/DL (ref 8.6–10)
CHLORIDE SERPL-SCNC: 113 MMOL/L (ref 98–111)
CO2 SERPL-SCNC: 24 MMOL/L (ref 22–29)
CREAT SERPL-MCNC: 1 MG/DL (ref 0.5–1.2)
DESCRIPTION BLOOD BANK: NORMAL
DESCRIPTION BLOOD BANK: NORMAL
EOSINOPHIL # BLD: 0.2 K/UL (ref 0–0.6)
EOSINOPHIL NFR BLD: 3.6 % (ref 0–5)
ERYTHROCYTE [DISTWIDTH] IN BLOOD BY AUTOMATED COUNT: 15.1 % (ref 11.5–14.5)
ERYTHROCYTE [DISTWIDTH] IN BLOOD BY AUTOMATED COUNT: 15.4 % (ref 11.5–14.5)
FACT VIII ACT/NOR PPP: 213 % (ref 56–191)
GLUCOSE SERPL-MCNC: 94 MG/DL (ref 74–109)
HCT VFR BLD AUTO: 20.4 % (ref 42–52)
HCT VFR BLD AUTO: 23.8 % (ref 42–52)
HCT VFR BLD AUTO: 25.8 % (ref 42–52)
HCT VFR BLD AUTO: 28.2 % (ref 42–52)
HGB BLD-MCNC: 6.7 G/DL (ref 14–18)
HGB BLD-MCNC: 8.1 G/DL (ref 14–18)
HGB BLD-MCNC: 8.6 G/DL (ref 14–18)
HGB BLD-MCNC: 9.3 G/DL (ref 14–18)
IMM GRANULOCYTES # BLD: 0 K/UL
LYMPHOCYTES # BLD: 2.1 K/UL (ref 1.1–4.5)
LYMPHOCYTES NFR BLD: 39 % (ref 20–40)
MAGNESIUM SERPL-MCNC: 1.8 MG/DL (ref 1.6–2.6)
MCH RBC QN AUTO: 30.5 PG (ref 27–31)
MCH RBC QN AUTO: 30.6 PG (ref 27–31)
MCHC RBC AUTO-ENTMCNC: 32.8 G/DL (ref 33–37)
MCHC RBC AUTO-ENTMCNC: 33.3 G/DL (ref 33–37)
MCV RBC AUTO: 91.5 FL (ref 80–94)
MCV RBC AUTO: 93.2 FL (ref 80–94)
MONOCYTES # BLD: 0.3 K/UL (ref 0–0.9)
MONOCYTES NFR BLD: 6.3 % (ref 0–10)
NEUTROPHILS # BLD: 2.7 K/UL (ref 1.5–7.5)
NEUTS SEG NFR BLD: 50.1 % (ref 50–65)
PLATELET # BLD AUTO: 200 K/UL (ref 130–400)
PLATELET # BLD AUTO: 214 K/UL (ref 130–400)
PMV BLD AUTO: 9.6 FL (ref 9.4–12.4)
PMV BLD AUTO: 9.9 FL (ref 9.4–12.4)
POTASSIUM SERPL-SCNC: 3.3 MMOL/L (ref 3.5–5)
POTASSIUM SERPL-SCNC: 3.6 MMOL/L (ref 3.5–5)
RBC # BLD AUTO: 2.19 M/UL (ref 4.7–6.1)
RBC # BLD AUTO: 2.82 M/UL (ref 4.7–6.1)
SODIUM SERPL-SCNC: 144 MMOL/L (ref 136–145)
VWF AG ACT/NOR PPP IA: 183 % (ref 52–214)
VWF:RCO ACT/NOR PPP PL AGG: 295 % (ref 51–215)
WBC # BLD AUTO: 5.3 K/UL (ref 4.8–10.8)
WBC # BLD AUTO: 6.5 K/UL (ref 4.8–10.8)

## 2023-11-03 PROCEDURE — C9113 INJ PANTOPRAZOLE SODIUM, VIA: HCPCS | Performed by: INTERNAL MEDICINE

## 2023-11-03 PROCEDURE — 86901 BLOOD TYPING SEROLOGIC RH(D): CPT

## 2023-11-03 PROCEDURE — 85027 COMPLETE CBC AUTOMATED: CPT

## 2023-11-03 PROCEDURE — 83735 ASSAY OF MAGNESIUM: CPT

## 2023-11-03 PROCEDURE — 85025 COMPLETE CBC W/AUTO DIFF WBC: CPT

## 2023-11-03 PROCEDURE — 3700000001 HC ADD 15 MINUTES (ANESTHESIA): Performed by: INTERNAL MEDICINE

## 2023-11-03 PROCEDURE — 6360000002 HC RX W HCPCS: Performed by: INTERNAL MEDICINE

## 2023-11-03 PROCEDURE — 84132 ASSAY OF SERUM POTASSIUM: CPT

## 2023-11-03 PROCEDURE — P9073 PLATELETS PHERESIS PATH REDU: HCPCS

## 2023-11-03 PROCEDURE — 3609010400 HC COLONOSCOPY POLYPECTOMY HOT BIOPSY: Performed by: INTERNAL MEDICINE

## 2023-11-03 PROCEDURE — 86850 RBC ANTIBODY SCREEN: CPT

## 2023-11-03 PROCEDURE — 2000000000 HC ICU R&B

## 2023-11-03 PROCEDURE — 0DJ08ZZ INSPECTION OF UPPER INTESTINAL TRACT, VIA NATURAL OR ARTIFICIAL OPENING ENDOSCOPIC: ICD-10-PCS | Performed by: INTERNAL MEDICINE

## 2023-11-03 PROCEDURE — 2580000003 HC RX 258: Performed by: INTERNAL MEDICINE

## 2023-11-03 PROCEDURE — 99232 SBSQ HOSP IP/OBS MODERATE 35: CPT | Performed by: INTERNAL MEDICINE

## 2023-11-03 PROCEDURE — 36415 COLL VENOUS BLD VENIPUNCTURE: CPT

## 2023-11-03 PROCEDURE — 3700000000 HC ANESTHESIA ATTENDED CARE: Performed by: INTERNAL MEDICINE

## 2023-11-03 PROCEDURE — 6360000002 HC RX W HCPCS: Performed by: NURSE ANESTHETIST, CERTIFIED REGISTERED

## 2023-11-03 PROCEDURE — 85014 HEMATOCRIT: CPT

## 2023-11-03 PROCEDURE — 85018 HEMOGLOBIN: CPT

## 2023-11-03 PROCEDURE — 2580000003 HC RX 258: Performed by: NURSE ANESTHETIST, CERTIFIED REGISTERED

## 2023-11-03 PROCEDURE — 88305 TISSUE EXAM BY PATHOLOGIST: CPT

## 2023-11-03 PROCEDURE — 86923 COMPATIBILITY TEST ELECTRIC: CPT

## 2023-11-03 PROCEDURE — 80048 BASIC METABOLIC PNL TOTAL CA: CPT

## 2023-11-03 PROCEDURE — P9016 RBC LEUKOCYTES REDUCED: HCPCS

## 2023-11-03 PROCEDURE — 45388 COLONOSCOPY W/ABLATION: CPT | Performed by: INTERNAL MEDICINE

## 2023-11-03 PROCEDURE — 3609027000 HC COLONOSCOPY: Performed by: INTERNAL MEDICINE

## 2023-11-03 PROCEDURE — 3609010200 HC COLONOSCOPY ABLATION TUMOR POLYP/OTHER LES: Performed by: INTERNAL MEDICINE

## 2023-11-03 PROCEDURE — 36430 TRANSFUSION BLD/BLD COMPNT: CPT

## 2023-11-03 PROCEDURE — 0DBL8ZX EXCISION OF TRANSVERSE COLON, VIA NATURAL OR ARTIFICIAL OPENING ENDOSCOPIC, DIAGNOSTIC: ICD-10-PCS | Performed by: INTERNAL MEDICINE

## 2023-11-03 PROCEDURE — 86900 BLOOD TYPING SEROLOGIC ABO: CPT

## 2023-11-03 PROCEDURE — 2709999900 HC NON-CHARGEABLE SUPPLY: Performed by: INTERNAL MEDICINE

## 2023-11-03 PROCEDURE — 2500000003 HC RX 250 WO HCPCS: Performed by: NURSE ANESTHETIST, CERTIFIED REGISTERED

## 2023-11-03 RX ORDER — SODIUM CHLORIDE 9 MG/ML
INJECTION, SOLUTION INTRAVENOUS PRN
Status: DISCONTINUED | OUTPATIENT
Start: 2023-11-03 | End: 2023-11-03

## 2023-11-03 RX ORDER — SODIUM CHLORIDE 9 MG/ML
INJECTION, SOLUTION INTRAVENOUS PRN
Status: DISCONTINUED | OUTPATIENT
Start: 2023-11-03 | End: 2023-11-03 | Stop reason: HOSPADM

## 2023-11-03 RX ORDER — PROPOFOL 10 MG/ML
INJECTION, EMULSION INTRAVENOUS CONTINUOUS PRN
Status: DISCONTINUED | OUTPATIENT
Start: 2023-11-03 | End: 2023-11-03 | Stop reason: SDUPTHER

## 2023-11-03 RX ORDER — FENTANYL CITRATE 50 UG/ML
INJECTION, SOLUTION INTRAMUSCULAR; INTRAVENOUS PRN
Status: DISCONTINUED | OUTPATIENT
Start: 2023-11-03 | End: 2023-11-03 | Stop reason: SDUPTHER

## 2023-11-03 RX ORDER — SODIUM CHLORIDE 0.9 % (FLUSH) 0.9 %
5-40 SYRINGE (ML) INJECTION PRN
Status: DISCONTINUED | OUTPATIENT
Start: 2023-11-03 | End: 2023-11-03 | Stop reason: HOSPADM

## 2023-11-03 RX ORDER — LIDOCAINE HYDROCHLORIDE 10 MG/ML
INJECTION, SOLUTION INFILTRATION; PERINEURAL PRN
Status: DISCONTINUED | OUTPATIENT
Start: 2023-11-03 | End: 2023-11-03 | Stop reason: SDUPTHER

## 2023-11-03 RX ORDER — SODIUM CHLORIDE 0.9 % (FLUSH) 0.9 %
5-40 SYRINGE (ML) INJECTION EVERY 12 HOURS SCHEDULED
Status: DISCONTINUED | OUTPATIENT
Start: 2023-11-03 | End: 2023-11-03 | Stop reason: HOSPADM

## 2023-11-03 RX ORDER — SODIUM CHLORIDE, SODIUM LACTATE, POTASSIUM CHLORIDE, CALCIUM CHLORIDE 600; 310; 30; 20 MG/100ML; MG/100ML; MG/100ML; MG/100ML
INJECTION, SOLUTION INTRAVENOUS CONTINUOUS PRN
Status: DISCONTINUED | OUTPATIENT
Start: 2023-11-03 | End: 2023-11-03 | Stop reason: SDUPTHER

## 2023-11-03 RX ORDER — POTASSIUM CHLORIDE 7.45 MG/ML
10 INJECTION INTRAVENOUS PRN
Status: DISCONTINUED | OUTPATIENT
Start: 2023-11-03 | End: 2023-11-06 | Stop reason: HOSPADM

## 2023-11-03 RX ADMIN — PHENYLEPHRINE HYDROCHLORIDE 100 MCG: 10 INJECTION INTRAVENOUS at 15:58

## 2023-11-03 RX ADMIN — SODIUM CHLORIDE: 9 INJECTION, SOLUTION INTRAVENOUS at 06:31

## 2023-11-03 RX ADMIN — PANTOPRAZOLE SODIUM 8 MG/HR: 40 INJECTION, POWDER, FOR SOLUTION INTRAVENOUS at 05:45

## 2023-11-03 RX ADMIN — FENTANYL CITRATE 50 MCG: 50 INJECTION INTRAMUSCULAR; INTRAVENOUS at 15:34

## 2023-11-03 RX ADMIN — POTASSIUM CHLORIDE 10 MEQ: 7.46 INJECTION, SOLUTION INTRAVENOUS at 14:35

## 2023-11-03 RX ADMIN — FENTANYL CITRATE 50 MCG: 50 INJECTION INTRAMUSCULAR; INTRAVENOUS at 15:29

## 2023-11-03 RX ADMIN — PANTOPRAZOLE SODIUM 8 MG/HR: 40 INJECTION, POWDER, FOR SOLUTION INTRAVENOUS at 20:33

## 2023-11-03 RX ADMIN — PHENYLEPHRINE HYDROCHLORIDE 100 MCG: 10 INJECTION INTRAVENOUS at 15:48

## 2023-11-03 RX ADMIN — SODIUM CHLORIDE: 9 INJECTION, SOLUTION INTRAVENOUS at 20:34

## 2023-11-03 RX ADMIN — PHENYLEPHRINE HYDROCHLORIDE 100 MCG: 10 INJECTION INTRAVENOUS at 15:44

## 2023-11-03 RX ADMIN — SODIUM CHLORIDE, SODIUM LACTATE, POTASSIUM CHLORIDE, AND CALCIUM CHLORIDE: 600; 310; 30; 20 INJECTION, SOLUTION INTRAVENOUS at 15:18

## 2023-11-03 RX ADMIN — LIDOCAINE HYDROCHLORIDE 50 MG: 10 INJECTION, SOLUTION INFILTRATION; PERINEURAL at 15:24

## 2023-11-03 RX ADMIN — PROPOFOL 140 MCG/KG/MIN: 10 INJECTION, EMULSION INTRAVENOUS at 15:24

## 2023-11-03 ASSESSMENT — LIFESTYLE VARIABLES: SMOKING_STATUS: 0

## 2023-11-03 ASSESSMENT — PAIN SCALES - GENERAL
PAINLEVEL_OUTOF10: 0
PAINLEVEL_OUTOF10: 0

## 2023-11-03 NOTE — PLAN OF CARE
Problem: Discharge Planning  Goal: Discharge to home or other facility with appropriate resources  Outcome: Progressing  Flowsheets (Taken 11/3/2023 0800)  Discharge to home or other facility with appropriate resources: Identify barriers to discharge with patient and caregiver     Problem: Safety - Adult  Goal: Free from fall injury  Outcome: Progressing  Flowsheets (Taken 11/3/2023 1000)  Free From Fall Injury: Instruct family/caregiver on patient safety     Problem: ABCDS Injury Assessment  Goal: Absence of physical injury  Outcome: Progressing     Problem: Skin/Tissue Integrity  Goal: Absence of new skin breakdown  Description: 1. Monitor for areas of redness and/or skin breakdown  2. Assess vascular access sites hourly  3. Every 4-6 hours minimum:  Change oxygen saturation probe site  4. Every 4-6 hours:  If on nasal continuous positive airway pressure, respiratory therapy assess nares and determine need for appliance change or resting period.   Outcome: Progressing     Problem: Pain  Goal: Verbalizes/displays adequate comfort level or baseline comfort level  Outcome: Progressing

## 2023-11-03 NOTE — PROGRESS NOTES
MEDICAL ONCOLOGY PROGRESS NOTE    Pt Name: Sheila Howe  MRN: [id number]  YOB: 1975  Date of evaluation: 11/3/2023    Subjective-No new complains. However, CBC showed significant drop in hemoglobin. Patient is scheduled for full colonoscopy today. HISTORY OF PRESENT ILLNESS:  Sheila Howe was first seen by me on 11/1/2023. I was consulted during patient hospitalization in the MICU. I was consulted by GI due to concern of von Willebrand disease. The patient presented to the ER department with complaints of hematochezia. He experienced a syncopal episode. He was hypotensive in the emergency and diaphoretic. He received 1 unit PRBC and was admitted to the MICU unit. Of note, the patient has a history of coronary artery disease and underwent left side heart catheterization. He was started on aspirin and Plavix. His antiplatelet therapy are being held. The patient was seen by GI. He has frequent GI bleed. I was consulted to help with management of his GI bleed. A colonoscopy will be performed when safe. CBC in the emergency showed hemoglobin 11.8. He received 1 unit PRBC. Platelets were normal.  PT and APTT were normal.  10/31/2023-CT abdomen pelvis without contrast remarkable for:FINDINGS:  There is dependent atelectasis. Evaluation is limited due to lack of contrast.  The liver is unremarkable. The gallbladder is minimally distended. Adrenal glands are unremarkable. Kidneys are unremarkable. Spleen is unremarkable. The pancreas is unremarkable. The stomach is minimally distended. The small bowel in the abdomen and pelvis is unremarkable. Tubing extends into the abdomen terminating inferior pelvis. The colon is unremarkable in appearance with no acute blood noted internally. There is no free air, free fluid or lymphadenopathy. Prostate is normal.  Urinary bladder is partially distended. There is no lymphadenopathy. There is a fat-containing umbilical hernia.   There is minimal aspirin  -Monitor CBC twice daily  -Methylprednisolone 25 mg IV once 11/1/2023  -Transfuse for hemoglobin<7  -Flexible sigmoidoscopy-findings of a rectal mass  -Awaiting for colonoscopy      #Life-threatening GI bleed- 2/2 rectal mass @ 10 cm     -Continue to monitor hemoglobin  -Transfuse for hemoglobin<7  -Transfuse platelets if hemoglobin continues to drop  -Methylprednisolone 25 mg IV x1 dose 11/1/2023    #Abnormal platelet function-patient was on aspirin and Plavix  -Transfuse 1 unit platelets 87/0/3362  -Transfused 1 unit platelets today 79/5/8300  -Transfuse 1 unit platelets 91/1/1806 due to active bleeding  -Von Willebrand panel in process  -Transfuse platelets if persistent GI bleed  Regular use of clopidogrel (75 mg daily) can produce 40%-50% inhibition of ADP-induced platelet aggregation. 1,2 Platelet function recovers gradually 3-5 days after drug withdrawal.2 In some circumstances, acute reversal is required, but no specific agent is documented except platelet transfusion. There is some data regarding injection of methylprednisolone 25 mg IV.     Ref:Acute Reversal of Clopidogrel-Related Platelet Inhibition Using Methyl Prednisolone in a Patient with Intracranial Hemorrhage    Rectal mass-status post biopsy  -Awaiting pathology-pending  -Differential diagnosis to include cancer versus others    PLAN:  Von Willebrand factor work-up in process  Transfuse 1 unit PRBC for hemoglobin<7  Transfuse 1 unit platelets if active bleeding  Discussed care plan with GI and bedside RN  Discussed sigmoidoscopy findings with patient  Continue to hold Plavix and aspirin  Transfuse 1 unit PRBC and platelets today 88/2/2127 due to active bleeding  Proceed with full colonoscopy today      (Please note that portions of this note were completed with a voice recognition program. Efforts were made to edit the dictations but occasionally words are mis-transcribed.)        Robbi Diaz MD    11/03/23  5:55 AM

## 2023-11-03 NOTE — ANESTHESIA POSTPROCEDURE EVALUATION
Department of Anesthesiology  Postprocedure Note    Patient: Maria R Montelongo  MRN: [id number]  YOB: 1975  Date of evaluation: 11/3/2023      Procedure Summary     Date: 11/03/23 Room / Location: [address]    Anesthesia Start: 1518 Anesthesia Stop:     Procedures:       COLONOSCOPY POLYPECTOMY HOT BIOPSY      COLONOSCOPY POLYPECTOMY ABLATION      COLONOSCOPY DIAGNOSTIC Diagnosis:       GI bleed      (GI bleed [K92.2])    Surgeons: Elvia Lozano MD Responsible Provider: ANAT Henderson CRNA    Anesthesia Type: MAC ASA Status: 3          Anesthesia Type: No value filed.     Danika Phase I:      Danika Phase II:        Anesthesia Post Evaluation    Patient location during evaluation: bedside  Patient participation: complete - patient participated  Level of consciousness: awake  Pain score: 0  Airway patency: patent  Nausea & Vomiting: no nausea and no vomiting  Complications: no  Cardiovascular status: hemodynamically stable  Respiratory status: acceptable and spontaneous ventilation  Hydration status: euvolemic  Pain management: adequate

## 2023-11-03 NOTE — BRIEF OP NOTE
Brief Postoperative Note      Patient: Luciano Ocasio  YOB: 1975  MRN: [id number]    Date of Procedure: 11/3/2023    Pre-Op Diagnosis Codes:     * GI bleed [K92.2]    Post-Op Diagnosis: polypoid tumor at 10 cm oozing blood, 1 cm benign polyp at 68 cm in distal transverse colon biopsied and ablated, no Small bowel lesion in distal 20 cm of distal ileum     Procedure:  Colonoscopy with hot biopsy ablation of polyp       Surgeon(s):  MD Randolph Ryder MD    Assistant:  Danyelle Mae RN    Anesthesia: Monitor Anesthesia Care    Estimated Blood Loss (mL): 476 ml    Complications: none    Specimens:   ID Type Source Tests Collected by Time Destination   A : colon polyp at 68 cm Tissue Colon SURGICAL PATHOLOGY Randolph Chavez MD 11/3/2023 1634        Implants:  * No implants in log *      Drains:   Urinary Catheter 11/01/23 [place] (Active)   Catheter Indications Urinary retention (acute or chronic), continuous bladder irrigation or bladder outlet obstruction 11/03/23 0800   Site Assessment No urethral drainage 11/03/23 0800   Urine Color Yellow 11/03/23 0800   Urine Appearance Clear 11/03/23 0800   Collection Container Standard 11/03/23 0800   Securement Method Securing device (Describe) 11/03/23 0800   Catheter Care  Perineal wipes 11/02/23 2000   Catheter Best Practices  Drainage tube clipped to bed;Catheter secured to thigh; Tamper seal intact; Bag below bladder;Bag not on floor; Lack of dependent loop in tubing;Drainage bag less than half full 11/03/23 0800   Status Draining 11/03/23 0800   Output (mL) 130 mL 11/03/23 1400       Findings: rectal tumor is continued source of bleeding    Plan:  CTA Abdomen tomorrow,surgical consultation      Electronically signed by Randolph Chavez MD on 11/3/2023 at 4:57 PM

## 2023-11-03 NOTE — PROGRESS NOTES
Hospitalist Progress Note  [facility]     Patient: Sona Jackson  : 1975  MRN: [id number]  Code Status: Full Code    Hospital Day: 4   Date of Service: 11/3/2023    Subjective:   Patient seen and examined. No current complaints. Past Medical History:   Diagnosis Date    Anxiety     History of blood transfusion        Past Surgical History:   Procedure Laterality Date    CARDIAC SURGERY      Heart cath-[facility]aser- 10/9/23    SIGMOIDOSCOPY N/A 2023    Dr Lamonte Hoffmann lower GI bleeding, probably originating from polypoid rectal mass, higher levels of the colon remain unseen for now    VENTRICULOPERITONEAL SHUNT         History reviewed. No pertinent family history.     Social History     Socioeconomic History    Marital status: Single     Spouse name: Not on file    Number of children: Not on file    Years of education: Not on file    Highest education level: Not on file   Occupational History    Not on file   Tobacco Use    Smoking status: Never    Smokeless tobacco: Never   Vaping Use    Vaping Use: Never used   Substance and Sexual Activity    Alcohol use: Never    Drug use: Never    Sexual activity: Not on file   Other Topics Concern    Not on file   Social History Narrative    Not on file     Social Determinants of Health     Financial Resource Strain: Not on file   Food Insecurity: Not on file   Transportation Needs: Not on file   Physical Activity: Not on file   Stress: Not on file   Social Connections: Not on file   Intimate Partner Violence: Not on file   Housing Stability: Not on file       Current Facility-Administered Medications   Medication Dose Route Frequency Provider Last Rate Last Admin    [facility] potassium chloride 10 mEq/100 mL IVPB (Peripheral Line)  10 mEq IntraVENous PRN Julia Deshpande  mL/hr at 23 1435 10 mEq at 23 1435    [MAR Hold] 0.9 % sodium chloride infusion   IntraVENous Continuous Jose Luis Richter MD 75 mL/hr at 23 1516

## 2023-11-04 LAB
ANION GAP SERPL CALCULATED.3IONS-SCNC: 9 MMOL/L (ref 7–19)
BASOPHILS # BLD: 0 K/UL (ref 0–0.2)
BASOPHILS NFR BLD: 0.6 % (ref 0–1)
BUN SERPL-MCNC: 5 MG/DL (ref 6–20)
CALCIUM SERPL-MCNC: 7.6 MG/DL (ref 8.6–10)
CHLORIDE SERPL-SCNC: 110 MMOL/L (ref 98–111)
CO2 SERPL-SCNC: 25 MMOL/L (ref 22–29)
CREAT SERPL-MCNC: 0.9 MG/DL (ref 0.5–1.2)
EOSINOPHIL # BLD: 0.3 K/UL (ref 0–0.6)
EOSINOPHIL NFR BLD: 5.2 % (ref 0–5)
ERYTHROCYTE [DISTWIDTH] IN BLOOD BY AUTOMATED COUNT: 15.1 % (ref 11.5–14.5)
GLUCOSE SERPL-MCNC: 93 MG/DL (ref 74–109)
HCT VFR BLD AUTO: 24.5 % (ref 42–52)
HCT VFR BLD AUTO: 25.6 % (ref 42–52)
HCT VFR BLD AUTO: 27.5 % (ref 42–52)
HGB BLD-MCNC: 8.4 G/DL (ref 14–18)
HGB BLD-MCNC: 8.5 G/DL (ref 14–18)
HGB BLD-MCNC: 9.1 G/DL (ref 14–18)
IMM GRANULOCYTES # BLD: 0 K/UL
LYMPHOCYTES # BLD: 1.5 K/UL (ref 1.1–4.5)
LYMPHOCYTES NFR BLD: 28.5 % (ref 20–40)
MAGNESIUM SERPL-MCNC: 1.8 MG/DL (ref 1.6–2.6)
MCH RBC QN AUTO: 31.6 PG (ref 27–31)
MCHC RBC AUTO-ENTMCNC: 34.3 G/DL (ref 33–37)
MCV RBC AUTO: 92.1 FL (ref 80–94)
MONOCYTES # BLD: 0.4 K/UL (ref 0–0.9)
MONOCYTES NFR BLD: 7.5 % (ref 0–10)
NEUTROPHILS # BLD: 3 K/UL (ref 1.5–7.5)
NEUTS SEG NFR BLD: 58 % (ref 50–65)
PLATELET # BLD AUTO: 189 K/UL (ref 130–400)
PMV BLD AUTO: 9.5 FL (ref 9.4–12.4)
POTASSIUM SERPL-SCNC: 3.4 MMOL/L (ref 3.5–5)
RBC # BLD AUTO: 2.66 M/UL (ref 4.7–6.1)
SODIUM SERPL-SCNC: 144 MMOL/L (ref 136–145)
WBC # BLD AUTO: 5.2 K/UL (ref 4.8–10.8)

## 2023-11-04 PROCEDURE — 99232 SBSQ HOSP IP/OBS MODERATE 35: CPT | Performed by: INTERNAL MEDICINE

## 2023-11-04 PROCEDURE — 85014 HEMATOCRIT: CPT

## 2023-11-04 PROCEDURE — 1210000000 HC MED SURG R&B

## 2023-11-04 PROCEDURE — 6370000000 HC RX 637 (ALT 250 FOR IP): Performed by: INTERNAL MEDICINE

## 2023-11-04 PROCEDURE — 2580000003 HC RX 258: Performed by: INTERNAL MEDICINE

## 2023-11-04 PROCEDURE — C9113 INJ PANTOPRAZOLE SODIUM, VIA: HCPCS | Performed by: INTERNAL MEDICINE

## 2023-11-04 PROCEDURE — 85025 COMPLETE CBC W/AUTO DIFF WBC: CPT

## 2023-11-04 PROCEDURE — 6360000002 HC RX W HCPCS: Performed by: INTERNAL MEDICINE

## 2023-11-04 PROCEDURE — 83735 ASSAY OF MAGNESIUM: CPT

## 2023-11-04 PROCEDURE — APPSS15 APP SPLIT SHARED TIME 0-15 MINUTES: Performed by: PHYSICIAN ASSISTANT

## 2023-11-04 PROCEDURE — 80048 BASIC METABOLIC PNL TOTAL CA: CPT

## 2023-11-04 PROCEDURE — 85018 HEMOGLOBIN: CPT

## 2023-11-04 PROCEDURE — 36415 COLL VENOUS BLD VENIPUNCTURE: CPT

## 2023-11-04 RX ORDER — PANTOPRAZOLE SODIUM 40 MG/1
40 TABLET, DELAYED RELEASE ORAL
Status: DISCONTINUED | OUTPATIENT
Start: 2023-11-05 | End: 2023-11-06 | Stop reason: HOSPADM

## 2023-11-04 RX ORDER — POTASSIUM CHLORIDE 20 MEQ/1
40 TABLET, EXTENDED RELEASE ORAL ONCE
Status: COMPLETED | OUTPATIENT
Start: 2023-11-04 | End: 2023-11-04

## 2023-11-04 RX ADMIN — SODIUM CHLORIDE: 9 INJECTION, SOLUTION INTRAVENOUS at 04:45

## 2023-11-04 RX ADMIN — ACETAMINOPHEN 650 MG: 325 TABLET ORAL at 04:10

## 2023-11-04 RX ADMIN — PANTOPRAZOLE SODIUM 8 MG/HR: 40 INJECTION, POWDER, FOR SOLUTION INTRAVENOUS at 05:33

## 2023-11-04 RX ADMIN — POTASSIUM CHLORIDE 40 MEQ: 1500 TABLET, EXTENDED RELEASE ORAL at 13:50

## 2023-11-04 ASSESSMENT — PAIN SCALES - GENERAL
PAINLEVEL_OUTOF10: 0
PAINLEVEL_OUTOF10: 0

## 2023-11-04 ASSESSMENT — PAIN DESCRIPTION - DESCRIPTORS: DESCRIPTORS: ACHING

## 2023-11-04 ASSESSMENT — PAIN DESCRIPTION - LOCATION: LOCATION: HEAD

## 2023-11-04 ASSESSMENT — PAIN SCALES - WONG BAKER: WONGBAKER_NUMERICALRESPONSE: 0

## 2023-11-04 NOTE — PROGRESS NOTES
Hospitalist Progress Note  [facility]     Patient: Jennifer Xavier  : 1975  MRN: [id number]  Code Status: Full Code    Hospital Day: 5   Date of Service: 2023    Subjective:   Patient seen and examined. No current complaints. Past Medical History:   Diagnosis Date    Anxiety     History of blood transfusion        Past Surgical History:   Procedure Laterality Date    CARDIAC SURGERY      Heart cath-[facility]aser- 10/9/23    SIGMOIDOSCOPY N/A 2023    Dr Ayaka Jimenez lower GI bleeding, probably originating from polypoid rectal mass, higher levels of the colon remain unseen for now    VENTRICULOPERITONEAL SHUNT         History reviewed. No pertinent family history.     Social History     Socioeconomic History    Marital status: Single     Spouse name: Not on file    Number of children: Not on file    Years of education: Not on file    Highest education level: Not on file   Occupational History    Not on file   Tobacco Use    Smoking status: Never    Smokeless tobacco: Never   Vaping Use    Vaping Use: Never used   Substance and Sexual Activity    Alcohol use: Never    Drug use: Never    Sexual activity: Not on file   Other Topics Concern    Not on file   Social History Narrative    Not on file     Social Determinants of Health     Financial Resource Strain: Not on file   Food Insecurity: Not on file   Transportation Needs: Not on file   Physical Activity: Not on file   Stress: Not on file   Social Connections: Not on file   Intimate Partner Violence: Not on file   Housing Stability: Not on file       Current Facility-Administered Medications   Medication Dose Route Frequency Provider Last Rate Last Admin    [START ON 2023] pantoprazole (PROTONIX) tablet 40 mg  40 mg Oral QAM AC Gary Bennett MD        potassium chloride 10 mEq/100 mL IVPB (Peripheral Line)  10 mEq IntraVENous PRN Gary Bennett  mL/hr at 23 1435 10 mEq at 23 1435    0.9 % sodium --   --  189  --     < > = values in this interval not displayed. Recent Labs     11/02/23  0122 11/03/23  0336 11/03/23  1811 11/04/23  0620    144  --  144   K 3.7 3.3* 3.6 3.4*   ANIONGAP 11 7  --  9    113*  --  110   CO2 20* 24  --  25   BUN 16 12  --  5*   CREATININE 0.9 1.0  --  0.9   GLUCOSE 103 94  --  93   CALCIUM 7.2* 7.1*  --  7.6*     Recent Labs     11/02/23  0122 11/03/23  0336 11/04/23  0620   MG 1.6 1.8 1.8     Recent Labs     11/02/23  0122   AST 9   ALT 9   BILITOT 0.3   ALKPHOS 49     No results for input(s): \"PH\", \"PO2\", \"PCO2\", \"HCO3\", \"BE\", \"O2SAT\" in the last 72 hours. No results for input(s): \"TROPONINI\" in the last 72 hours. No results for input(s): \"INR\" in the last 72 hours. No results for input(s): \"LACTA\" in the last 72 hours. Intake/Output Summary (Last 24 hours) at 11/4/2023 1550  Last data filed at 11/4/2023 1532  Gross per 24 hour   Intake 2340.99 ml   Output 3170 ml   Net -829.01 ml       Colonoscopy    Result Date: 11/3/2023  No dictation     EGD    Result Date: 11/3/2023  No dictation      Assessment and Plan:   78-year-old male on aspirin and Plavix admitted to critical care unit for acute blood loss anemia secondary to GI bleed. GI following  Heme-onc following  Rectal mass pathology 11/1/2023: Fragments of villous adenoma, negative for evidence of high-grade dysplasia. General surgery consulted by GI to further evaluate  CEA 0.6  Further transfusions as warranted  Follow hemoglobin  Aspirin/Plavix remain on hold  S/p EGD/colonoscopy 11/3/2023, reports on file  SCDs for DVT prophylaxis  Discussed treatment plan with patient/RN/heme-onc/GI    The above note was generated using voice recognition software. Inadvertent typographical errors in transcription may have occurred.     Rashawn Mann MD   11/4/2023 3:50 PM

## 2023-11-04 NOTE — OP NOTE
[facility] [address], [address] [place] [address]                                OPERATIVE REPORT    PATIENT NAME: Chris Israel                        :        1975  MED REC NO:   [id number]                              ROOM:       [facility]  ACCOUNT NO:   [de-identified]                           ADMIT DATE: 10/30/2023  PROVIDER:     Samira Tsang MD    DATE OF PROCEDURE:  2023    PROCEDURE PERFORMED:  Colonoscopy with hot biopsy ablation of polyp at  68 cm. INDICATION:  A 53-year-old male has a major GI bleed. Recent flexible  sigmoidoscopy identified a polypoid tumor mass at 10 cm in the rectum. Bleeding has continued and therefore colonoscopy was done to rule out  lesion in either colon or distal small bowel as well. PREMEDICATION:  Propofol per anesthetist.    OPERATIVE PROCEDURE:  The Olympus video colonoscope was inserted into  the rectum and air insufflation completed. The distal rectum was  normal.  A polypoid tumor at 10 cm in the  hot rectum was still oozing bright red blood. There was no brisk  arterial bleeding or pumper. Photographs were obtained. The scope was  then advanced under direct visualization all the way to the cecum. Lavage and suctioning was necessary to improve the view. The patient's  cecum was emptied with no AVM. The appendiceal orifice was identified. The ileocecal valve was slit-like and the scope was intubated into the  ileum and passed a distance of 20 cm up into the ileum. There was dark  bile in the ileum and I could not be absolutely sure there was not a  small amount of blood present, but there was no bleeding source in the  distal ileum. The scope was withdrawn back into the colon. Each  segment of large bowel was then reviewed on the way out.   There was 1 cm  benign appearing polyp at 68 cm in the distal transverse colon, which  was photographed, biopsied and then ablated using hot biopsy

## 2023-11-04 NOTE — PROGRESS NOTES
Kody Ramirez transferred to 326 from [facility] via wheelchair. Reason for transfer: Lower level of care. Explained reason for transfer to Patient. Belongings: Glasses, cell phone,   with patient at bedside . Soft chart transferred with patient: Yes. Telemetry box number N/A transferred with patient: N/A. Report given to: [employer] , via telephone.       Electronically signed by Smitha Maguire RN on 11/4/2023 at 2:20 PM

## 2023-11-04 NOTE — CONSULTS
[facility] General Surgery     Consult Note    Patient ID: Jeffry Schilling  46 y.o.  male  YOB: 1975    Admitting Diagnosis: Syncope and collapse [R55]  Acute GI bleeding [K92.2]  Lower GI bleed [K92.2]  Symptomatic anemia [D64.9]  Hypotension, unspecified hypotension type [I95.9]      Chief Complaint:  Chief Complaint   Patient presents with    Rectal Bleeding     Had MI a few weeks ago and was placed him plavix, baby aspirin, lipitor, metoprolol. Started bleeding rectally today around 11am           History of Present Illness (HPI):   Mr. Miryam Yeager is a 46 y.o. male who presented to [address] [place] [address] with complaint of rectal bleeding on 10/30 and work-up including sigmoidoscopy demonstrated a rectal polypoid lesion and thus we have been asked to provide surgical opinion. He denies fever, chills, chest pain, SOB, n/v/d/c. The patient was admitted 5 days ago with the above complaint and previously admitted with angina over 3 weeks ago and diagnosed with a non-STEMI, a subsequent cardiac catheterization found no occlusive coronary artery and no stent placed, but placed on Plavix and aspirin after which he began passing blood about two weeks ago in the toilet. On 10/30 at work he had a large bloody bowel movement and subsequently had 7 or 8 more episodes of hematochezia and later came to the ED. In the ED he had further episode with syncope and hypotension which responding to fluids and blood. 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 He currently is demonstrating stable hemodynamics as well as stable hemoglobin over last 24 hours without evidence of bleeding in last 2 bowel movements which have been brown in color as per nursing.   He denies ever

## 2023-11-04 NOTE — OP NOTE
[facility] [address], [address] [place] [address]                                OPERATIVE REPORT    PATIENT NAME: Bette Washington                        :        1975  MED REC NO:   [id number]                              ROOM:       [facility]  ACCOUNT NO:   [de-identified]                           ADMIT DATE: 10/30/2023  PROVIDER:     Mignon Reynolds MD    DATE OF PROCEDURE:  2023    PROCEDURE PERFORMED:  EGD. INDICATION:  A 75-year-old white male has major GI blood loss. EGD has  done to rule out any upper GI bleed and lesion. PREMEDICATION:  Propofol per anesthetist.    PROCEDURE:  The Olympus video colonoscope was passed by mouth. The  esophagus had a normal mucosal lining down to the GE junction, located  at 42 cm. The stomach was insufflated with air. All gastric surfaces  appeared normal on both forward viewing and scope retroflexion. The  duodenal bulb had some mild focal areas of inflammation which were now  bleeding. There was no duodenal ulcer. The scope was advanced to full  length of the shaft with no blood or AVM in the second portion of the  duodenum. Photographs were taken. The scope was then removed from the  patient. The patient tolerated the procedure well. EBL none. FINDINGS:  1. Normal esophagus and stomach. 2.  Mild nonbleeding duodenitis. PLAN:  Continue Protonix.         Mignon Reynolds MD    D: 2023 18:31:21      T: 2023 1:50:04     [email or web address]  Job#: [id number]     Doc#: [id number]    CC:

## 2023-11-04 NOTE — PROGRESS NOTES
MEDICAL ONCOLOGY PROGRESS NOTE    Pt Name: Kody Ramirez  MRN: [id number]  YOB: 1975  Date of evaluation: 11/4/2023  ROOM: 147    Subjective - No further rectal bleeding. BM brown      HISTORY OF PRESENT ILLNESS:  Kody Ramirez was first seen by me on 11/1/2023. I was consulted during patient hospitalization in the MICU. I was consulted by GI due to concern of von Willebrand disease. The patient presented to the ER department with complaints of hematochezia. He experienced a syncopal episode. He was hypotensive in the emergency and diaphoretic. He received 1 unit PRBC and was admitted to the MICU unit. Of note, the patient has a history of coronary artery disease and underwent left side heart catheterization. He was started on aspirin and Plavix. His antiplatelet therapy are being held. The patient was seen by GI. He has frequent GI bleed. I was consulted to help with management of his GI bleed. A colonoscopy will be performed when safe. CBC in the emergency showed hemoglobin 11.8. He received 1 unit PRBC. Platelets were normal.  PT and APTT were normal.  10/31/2023-CT abdomen pelvis without contrast remarkable for:FINDINGS:  There is dependent atelectasis. Evaluation is limited due to lack of contrast.  The liver is unremarkable. The gallbladder is minimally distended. Adrenal glands are unremarkable. Kidneys are unremarkable. Spleen is unremarkable. The pancreas is unremarkable. The stomach is minimally distended. The small bowel in the abdomen and pelvis is unremarkable. Tubing extends into the abdomen terminating inferior pelvis. The colon is unremarkable in appearance with no acute blood noted internally. There is no free air, free fluid or lymphadenopathy. Prostate is normal.  Urinary bladder is partially distended. There is no lymphadenopathy. There is a fat-containing umbilical hernia. There is minimal degenerative change.   11/1/2023-flexible sigmoidoscopy showed a mass

## 2023-11-04 NOTE — PROGRESS NOTES
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 Plan: transfer to [facility] [place], advance to regular diet, change IV Protonix to orally.

## 2023-11-05 LAB
ANION GAP SERPL CALCULATED.3IONS-SCNC: 6 MMOL/L (ref 7–19)
BASOPHILS # BLD: 0 K/UL (ref 0–0.2)
BASOPHILS NFR BLD: 0.8 % (ref 0–1)
BUN SERPL-MCNC: 7 MG/DL (ref 6–20)
CALCIUM SERPL-MCNC: 8.3 MG/DL (ref 8.6–10)
CHLORIDE SERPL-SCNC: 108 MMOL/L (ref 98–111)
CO2 SERPL-SCNC: 29 MMOL/L (ref 22–29)
CREAT SERPL-MCNC: 0.9 MG/DL (ref 0.5–1.2)
EOSINOPHIL # BLD: 0.3 K/UL (ref 0–0.6)
EOSINOPHIL NFR BLD: 7.8 % (ref 0–5)
ERYTHROCYTE [DISTWIDTH] IN BLOOD BY AUTOMATED COUNT: 15.2 % (ref 11.5–14.5)
GLUCOSE SERPL-MCNC: 104 MG/DL (ref 74–109)
HCT VFR BLD AUTO: 24.5 % (ref 42–52)
HCT VFR BLD AUTO: 26.3 % (ref 42–52)
HCT VFR BLD AUTO: 27.6 % (ref 42–52)
HGB BLD-MCNC: 8.4 G/DL (ref 14–18)
HGB BLD-MCNC: 8.8 G/DL (ref 14–18)
HGB BLD-MCNC: 9.2 G/DL (ref 14–18)
IMM GRANULOCYTES # BLD: 0 K/UL
LYMPHOCYTES # BLD: 1.8 K/UL (ref 1.1–4.5)
LYMPHOCYTES NFR BLD: 44.2 % (ref 20–40)
MAGNESIUM SERPL-MCNC: 2 MG/DL (ref 1.6–2.6)
MCH RBC QN AUTO: 30.7 PG (ref 27–31)
MCHC RBC AUTO-ENTMCNC: 33.5 G/DL (ref 33–37)
MCV RBC AUTO: 91.6 FL (ref 80–94)
MONOCYTES # BLD: 0.4 K/UL (ref 0–0.9)
MONOCYTES NFR BLD: 9.3 % (ref 0–10)
NEUTROPHILS # BLD: 1.5 K/UL (ref 1.5–7.5)
NEUTS SEG NFR BLD: 37.6 % (ref 50–65)
PLATELET # BLD AUTO: 217 K/UL (ref 130–400)
PMV BLD AUTO: 9.5 FL (ref 9.4–12.4)
POTASSIUM SERPL-SCNC: 3.5 MMOL/L (ref 3.5–5)
RBC # BLD AUTO: 2.87 M/UL (ref 4.7–6.1)
SODIUM SERPL-SCNC: 143 MMOL/L (ref 136–145)
WBC # BLD AUTO: 4 K/UL (ref 4.8–10.8)

## 2023-11-05 PROCEDURE — 6370000000 HC RX 637 (ALT 250 FOR IP): Performed by: INTERNAL MEDICINE

## 2023-11-05 PROCEDURE — 85018 HEMOGLOBIN: CPT

## 2023-11-05 PROCEDURE — 85025 COMPLETE CBC W/AUTO DIFF WBC: CPT

## 2023-11-05 PROCEDURE — 99232 SBSQ HOSP IP/OBS MODERATE 35: CPT | Performed by: INTERNAL MEDICINE

## 2023-11-05 PROCEDURE — 94760 N-INVAS EAR/PLS OXIMETRY 1: CPT

## 2023-11-05 PROCEDURE — 36415 COLL VENOUS BLD VENIPUNCTURE: CPT

## 2023-11-05 PROCEDURE — 80048 BASIC METABOLIC PNL TOTAL CA: CPT

## 2023-11-05 PROCEDURE — 1210000000 HC MED SURG R&B

## 2023-11-05 PROCEDURE — 85014 HEMATOCRIT: CPT

## 2023-11-05 PROCEDURE — APPSS15 APP SPLIT SHARED TIME 0-15 MINUTES: Performed by: PHYSICIAN ASSISTANT

## 2023-11-05 PROCEDURE — 83735 ASSAY OF MAGNESIUM: CPT

## 2023-11-05 RX ADMIN — PANTOPRAZOLE SODIUM 40 MG: 40 TABLET, DELAYED RELEASE ORAL at 06:36

## 2023-11-05 NOTE — PROGRESS NOTES
Hospitalist Progress Note  [facility]     Patient: Andres Arenas  : 1975  MRN: [id number]  Code Status: Full Code    Hospital Day: 6   Date of Service: 2023    Subjective:   Patient seen and examined. No current complaints. Past Medical History:   Diagnosis Date    Anxiety     History of blood transfusion        Past Surgical History:   Procedure Laterality Date    CARDIAC SURGERY      Heart cath-[facility]- 10/9/23    SIGMOIDOSCOPY N/A 2023    Dr Sarita Almanzar lower GI bleeding, probably originating from polypoid rectal mass, higher levels of the colon remain unseen for now    VENTRICULOPERITONEAL SHUNT         History reviewed. No pertinent family history.     Social History     Socioeconomic History    Marital status: Single     Spouse name: Not on file    Number of children: Not on file    Years of education: Not on file    Highest education level: Not on file   Occupational History    Not on file   Tobacco Use    Smoking status: Never    Smokeless tobacco: Never   Vaping Use    Vaping Use: Never used   Substance and Sexual Activity    Alcohol use: Never    Drug use: Never    Sexual activity: Not on file   Other Topics Concern    Not on file   Social History Narrative    Not on file     Social Determinants of Health     Financial Resource Strain: Not on file   Food Insecurity: Not on file   Transportation Needs: Not on file   Physical Activity: Not on file   Stress: Not on file   Social Connections: Not on file   Intimate Partner Violence: Not on file   Housing Stability: Not on file       Current Facility-Administered Medications   Medication Dose Route Frequency Provider Last Rate Last Admin    pantoprazole (PROTONIX) tablet 40 mg  40 mg Oral QAM AC Sandor Calle MD   40 mg at 23 0636    potassium chloride 10 mEq/100 mL IVPB (Peripheral Line)  10 mEq IntraVENous PRN Sandor Calle  mL/hr at 23 1435 10 mEq at 23 1435    0.9 % sodium

## 2023-11-05 NOTE — PLAN OF CARE
Problem: Discharge Planning  Goal: Discharge to home or other facility with appropriate resources  Outcome: Progressing  Flowsheets  Taken 11/4/2023 2220 by Chapis Loja RN  Discharge to home or other facility with appropriate resources: Identify barriers to discharge with patient and caregiver  Taken 11/4/2023 1426 by Chon [place] RN  Discharge to home or other facility with appropriate resources:   Identify barriers to discharge with patient and caregiver   Refer to discharge planning if patient needs post-hospital services based on physician order or complex needs related to functional status, cognitive ability or social support system     Problem: Safety - Adult  Goal: Free from fall injury  Outcome: Progressing     Problem: ABCDS Injury Assessment  Goal: Absence of physical injury  Outcome: Progressing     Problem: Skin/Tissue Integrity  Goal: Absence of new skin breakdown  Description: 1. Monitor for areas of redness and/or skin breakdown  2. Assess vascular access sites hourly  3. Every 4-6 hours minimum:  Change oxygen saturation probe site  4. Every 4-6 hours:  If on nasal continuous positive airway pressure, respiratory therapy assess nares and determine need for appliance change or resting period.   Outcome: Progressing     Problem: Pain  Goal: Verbalizes/displays adequate comfort level or baseline comfort level  Outcome: Progressing

## 2023-11-05 NOTE — PLAN OF CARE
Problem: Discharge Planning  Goal: Discharge to home or other facility with appropriate resources  11/5/2023 1418 by Luz Maria Cardoza RN  Outcome: Progressing  Flowsheets (Taken 11/5/2023 1329 by Aminta Lloyd)  Discharge to home or other facility with appropriate resources: Identify barriers to discharge with patient and caregiver  11/5/2023 0151 by Winston Betancur RN  Outcome: Progressing  Flowsheets  Taken 11/4/2023 2220 by Winston Betancur RN  Discharge to home or other facility with appropriate resources: Identify barriers to discharge with patient and caregiver  Taken 11/4/2023 1426 by Joselo Hopkins RN  Discharge to home or other facility with appropriate resources:   Identify barriers to discharge with patient and caregiver   Refer to discharge planning if patient needs post-hospital services based on physician order or complex needs related to functional status, cognitive ability or social support system     Problem: Safety - Adult  Goal: Free from fall injury  11/5/2023 1418 by Luz Maria Cardoza RN  Outcome: Progressing  11/5/2023 0151 by Winston Betancur RN  Outcome: Progressing     Problem: ABCDS Injury Assessment  Goal: Absence of physical injury  11/5/2023 1418 by Luz Maria Cardoza RN  Outcome: Progressing  Flowsheets (Taken 11/5/2023 0805)  Absence of Physical Injury: Implement safety measures based on patient assessment  11/5/2023 0151 by Winston Betancur RN  Outcome: Progressing     Problem: Skin/Tissue Integrity  Goal: Absence of new skin breakdown  Description: 1. Monitor for areas of redness and/or skin breakdown  2. Assess vascular access sites hourly  3. Every 4-6 hours minimum:  Change oxygen saturation probe site  4. Every 4-6 hours:  If on nasal continuous positive airway pressure, respiratory therapy assess nares and determine need for appliance change or resting period.   11/5/2023 1418 by Luz Maria Cardoza RN  Outcome: Progressing  11/5/2023 0151 by

## 2023-11-05 NOTE — PROGRESS NOTES
MEDICAL ONCOLOGY PROGRESS NOTE    Pt Name: Say Mathew  MRN: [id number]  YOB: 1975  Date of evaluation: 11/5/2023  ROOM: 326    Subjective - No further rectal bleeding. BM brown      HISTORY OF PRESENT ILLNESS:  Say Mathew was first seen by me on 11/1/2023. I was consulted during patient hospitalization in the MICU. I was consulted by GI due to concern of von Willebrand disease. The patient presented to the ER department with complaints of hematochezia. He experienced a syncopal episode. He was hypotensive in the emergency and diaphoretic. He received 1 unit PRBC and was admitted to the MICU unit. Of note, the patient has a history of coronary artery disease and underwent left side heart catheterization. He was started on aspirin and Plavix. His antiplatelet therapy are being held. The patient was seen by GI. He has frequent GI bleed. I was consulted to help with management of his GI bleed. A colonoscopy will be performed when safe. CBC in the emergency showed hemoglobin 11.8. He received 1 unit PRBC. Platelets were normal.  PT and APTT were normal.  10/31/2023-CT abdomen pelvis without contrast remarkable for:FINDINGS:  There is dependent atelectasis. Evaluation is limited due to lack of contrast.  The liver is unremarkable. The gallbladder is minimally distended. Adrenal glands are unremarkable. Kidneys are unremarkable. Spleen is unremarkable. The pancreas is unremarkable. The stomach is minimally distended. The small bowel in the abdomen and pelvis is unremarkable. Tubing extends into the abdomen terminating inferior pelvis. The colon is unremarkable in appearance with no acute blood noted internally. There is no free air, free fluid or lymphadenopathy. Prostate is normal.  Urinary bladder is partially distended. There is no lymphadenopathy. There is a fat-containing umbilical hernia. There is minimal degenerative change.   11/1/2023-flexible sigmoidoscopy showed a mass

## 2023-11-06 VITALS
OXYGEN SATURATION: 98 % | WEIGHT: 178.79 LBS | DIASTOLIC BLOOD PRESSURE: 61 MMHG | HEART RATE: 87 BPM | RESPIRATION RATE: 16 BRPM | HEIGHT: 69 IN | BODY MASS INDEX: 26.48 KG/M2 | TEMPERATURE: 97.9 F | SYSTOLIC BLOOD PRESSURE: 95 MMHG

## 2023-11-06 LAB
ANION GAP SERPL CALCULATED.3IONS-SCNC: 11 MMOL/L (ref 7–19)
BASOPHILS # BLD: 0 K/UL (ref 0–0.2)
BASOPHILS NFR BLD: 0.6 % (ref 0–1)
BUN SERPL-MCNC: 11 MG/DL (ref 6–20)
CALCIUM SERPL-MCNC: 8.1 MG/DL (ref 8.6–10)
CHLORIDE SERPL-SCNC: 106 MMOL/L (ref 98–111)
CO2 SERPL-SCNC: 27 MMOL/L (ref 22–29)
CREAT SERPL-MCNC: 0.8 MG/DL (ref 0.5–1.2)
EKG P AXIS: 73 DEGREES
EKG P-R INTERVAL: 148 MS
EKG Q-T INTERVAL: 358 MS
EKG QRS DURATION: 76 MS
EKG QTC CALCULATION (BAZETT): 401 MS
EKG T AXIS: 80 DEGREES
EOSINOPHIL # BLD: 0.3 K/UL (ref 0–0.6)
EOSINOPHIL NFR BLD: 6.4 % (ref 0–5)
ERYTHROCYTE [DISTWIDTH] IN BLOOD BY AUTOMATED COUNT: 15.1 % (ref 11.5–14.5)
GLUCOSE SERPL-MCNC: 123 MG/DL (ref 74–109)
HCT VFR BLD AUTO: 25.2 % (ref 42–52)
HGB BLD-MCNC: 8.2 G/DL (ref 14–18)
IMM GRANULOCYTES # BLD: 0 K/UL
LYMPHOCYTES # BLD: 1.9 K/UL (ref 1.1–4.5)
LYMPHOCYTES NFR BLD: 40.3 % (ref 20–40)
MAGNESIUM SERPL-MCNC: 2 MG/DL (ref 1.6–2.6)
MCH RBC QN AUTO: 30.1 PG (ref 27–31)
MCHC RBC AUTO-ENTMCNC: 32.5 G/DL (ref 33–37)
MCV RBC AUTO: 92.6 FL (ref 80–94)
MONOCYTES # BLD: 0.4 K/UL (ref 0–0.9)
MONOCYTES NFR BLD: 7.7 % (ref 0–10)
NEUTROPHILS # BLD: 2.2 K/UL (ref 1.5–7.5)
NEUTS SEG NFR BLD: 45 % (ref 50–65)
PLATELET # BLD AUTO: 209 K/UL (ref 130–400)
PMV BLD AUTO: 9.9 FL (ref 9.4–12.4)
POTASSIUM SERPL-SCNC: 3.8 MMOL/L (ref 3.5–5)
RBC # BLD AUTO: 2.72 M/UL (ref 4.7–6.1)
SODIUM SERPL-SCNC: 144 MMOL/L (ref 136–145)
WBC # BLD AUTO: 4.8 K/UL (ref 4.8–10.8)

## 2023-11-06 PROCEDURE — 99232 SBSQ HOSP IP/OBS MODERATE 35: CPT | Performed by: INTERNAL MEDICINE

## 2023-11-06 PROCEDURE — 94760 N-INVAS EAR/PLS OXIMETRY 1: CPT

## 2023-11-06 PROCEDURE — 80048 BASIC METABOLIC PNL TOTAL CA: CPT

## 2023-11-06 PROCEDURE — 85025 COMPLETE CBC W/AUTO DIFF WBC: CPT

## 2023-11-06 PROCEDURE — 6370000000 HC RX 637 (ALT 250 FOR IP): Performed by: INTERNAL MEDICINE

## 2023-11-06 PROCEDURE — 83735 ASSAY OF MAGNESIUM: CPT

## 2023-11-06 PROCEDURE — APPSS15 APP SPLIT SHARED TIME 0-15 MINUTES: Performed by: NURSE PRACTITIONER

## 2023-11-06 PROCEDURE — 36415 COLL VENOUS BLD VENIPUNCTURE: CPT

## 2023-11-06 RX ORDER — HYDROCORTISONE 25 MG/G
CREAM TOPICAL
Qty: 1 EACH | Refills: 1 | Status: SHIPPED | OUTPATIENT
Start: 2023-11-06

## 2023-11-06 RX ORDER — PANTOPRAZOLE SODIUM 40 MG/1
40 TABLET, DELAYED RELEASE ORAL
Qty: 30 TABLET | Refills: 3 | Status: SHIPPED | OUTPATIENT
Start: 2023-11-07

## 2023-11-06 RX ADMIN — PANTOPRAZOLE SODIUM 40 MG: 40 TABLET, DELAYED RELEASE ORAL at 07:27

## 2023-11-06 ASSESSMENT — PAIN SCALES - GENERAL: PAINLEVEL_OUTOF10: 0

## 2023-11-06 NOTE — PLAN OF CARE
Problem: Discharge Planning  Goal: Discharge to home or other facility with appropriate resources  11/6/2023 1044 by Val Shine RN  Outcome: Progressing  Flowsheets (Taken 11/6/2023 1037)  Discharge to home or other facility with appropriate resources: Identify barriers to discharge with patient and caregiver  11/5/2023 2317 by Sunday Youssef RN  Outcome: Syed Velasco (Taken 11/5/2023 2159)  Discharge to home or other facility with appropriate resources: Identify barriers to discharge with patient and caregiver     Problem: Safety - Adult  Goal: Free from fall injury  11/6/2023 1044 by Val Shine RN  Outcome: Progressing  11/5/2023 2317 by Sunday Youssef RN  Outcome: Progressing     Problem: ABCDS Injury Assessment  Goal: Absence of physical injury  11/6/2023 1044 by Val Shine RN  Outcome: Progressing  Flowsheets (Taken 11/6/2023 1043)  Absence of Physical Injury: Implement safety measures based on patient assessment  11/5/2023 2317 by Sunday Youssef RN  Outcome: Progressing     Problem: Skin/Tissue Integrity  Goal: Absence of new skin breakdown  Description: 1. Monitor for areas of redness and/or skin breakdown  2. Assess vascular access sites hourly  3. Every 4-6 hours minimum:  Change oxygen saturation probe site  4. Every 4-6 hours:  If on nasal continuous positive airway pressure, respiratory therapy assess nares and determine need for appliance change or resting period.   11/6/2023 1044 by Val Shine RN  Outcome: Progressing  11/5/2023 2317 by Sunday Youssef RN  Outcome: Progressing     Problem: Pain  Goal: Verbalizes/displays adequate comfort level or baseline comfort level  11/6/2023 1044 by Val Shine RN  Outcome: Progressing  11/5/2023 2317 by Sunday Youssef RN  Outcome: Progressing

## 2023-11-06 NOTE — PROGRESS NOTES
MEDICAL ONCOLOGY PROGRESS NOTE    Pt Name: Donaldo Pettit  MRN: [id number]  YOB: 1975  Date of evaluation: 11/6/2023  ROOM: 326    Subjective -denies further rectal bleeding. HISTORY OF PRESENT ILLNESS:  Donaldo Pettit was first seen by me on 11/1/2023. Hematology was consulted during patient hospitalization in the MICU. I was consulted by GI due to concern of von Willebrand disease. The patient presented to the ER department with complaints of hematochezia. He experienced a syncopal episode. He was hypotensive in the emergency and diaphoretic. He received 1 unit PRBC and was admitted to the MICU unit. Of note, the patient has a history of coronary artery disease and underwent left side heart catheterization. He was started on aspirin and Plavix. His antiplatelet therapy are being held. The patient was seen by GI. He has frequent GI bleed. Hematology was consulted to help with management of his GI bleed. A colonoscopy will be performed when safe. CBC in the emergency showed hemoglobin 11.8. He received 1 unit PRBC. Platelets were normal.  PT and APTT were normal.  10/31/2023-CT abdomen pelvis without contrast remarkable for:FINDINGS:  There is dependent atelectasis. Evaluation is limited due to lack of contrast.  The liver is unremarkable. The gallbladder is minimally distended. Adrenal glands are unremarkable. Kidneys are unremarkable. Spleen is unremarkable. The pancreas is unremarkable. The stomach is minimally distended. The small bowel in the abdomen and pelvis is unremarkable. Tubing extends into the abdomen terminating inferior pelvis. The colon is unremarkable in appearance with no acute blood noted internally. There is no free air, free fluid or lymphadenopathy. Prostate is normal.  Urinary bladder is partially distended. There is no lymphadenopathy. There is a fat-containing umbilical hernia. There is minimal degenerative change.   11/1/2023-flexible sigmoidoscopy

## 2023-11-06 NOTE — PLAN OF CARE
Problem: Discharge Planning  Goal: Discharge to home or other facility with appropriate resources  11/5/2023 2317 by Trent Song RN  Outcome: Progressing  Flowsheets (Taken 11/5/2023 2159)  Discharge to home or other facility with appropriate resources: Identify barriers to discharge with patient and caregiver  11/5/2023 1418 by Leticia Rodriguez RN  Outcome: Progressing  Flowsheets (Taken 11/5/2023 1329 by Cesar Yeung)  Discharge to home or other facility with appropriate resources: Identify barriers to discharge with patient and caregiver     Problem: Safety - Adult  Goal: Free from fall injury  11/5/2023 2317 by Trent Song RN  Outcome: Progressing  11/5/2023 1418 by Leticia Rodriguez RN  Outcome: Progressing     Problem: ABCDS Injury Assessment  Goal: Absence of physical injury  11/5/2023 2317 by Trent Song RN  Outcome: Progressing  11/5/2023 1418 by Leticia Rodriguez RN  Outcome: Progressing  Flowsheets (Taken 11/5/2023 0805)  Absence of Physical Injury: Implement safety measures based on patient assessment     Problem: Skin/Tissue Integrity  Goal: Absence of new skin breakdown  Description: 1. Monitor for areas of redness and/or skin breakdown  2. Assess vascular access sites hourly  3. Every 4-6 hours minimum:  Change oxygen saturation probe site  4. Every 4-6 hours:  If on nasal continuous positive airway pressure, respiratory therapy assess nares and determine need for appliance change or resting period.   11/5/2023 2317 by Trent Song RN  Outcome: Progressing  11/5/2023 1418 by Leticia Rodriguez RN  Outcome: Progressing     Problem: Pain  Goal: Verbalizes/displays adequate comfort level or baseline comfort level  11/5/2023 2317 by Trent Song RN  Outcome: Progressing  11/5/2023 1418 by Leticia Rodriguez RN  Outcome: Progressing

## 2023-11-06 NOTE — DISCHARGE SUMMARY
Hospitalist Discharge Summary  [facility]    Patient: Mary Arauz  : 1975  MRN: [id number]  Code Status: Full Code  Attending: Guerda Anderson MD  Admission Date: 10/30/2023  Discharge Date: 2023    Discharge Medications:     Current Discharge Medication List             Details   hydrocortisone (ANUSOL-HC) 2.5 % CREA rectal cream Apply as directed  Qty: 1 each, Refills: 1      pantoprazole (PROTONIX) 40 MG tablet Take 1 tablet by mouth every morning (before breakfast)  Qty: 30 tablet, Refills: 3                Details   atorvastatin (LIPITOR) 80 MG tablet Take 1 tablet by mouth nightly  Qty: 90 tablet, Refills: 3      nitroGLYCERIN (NITROSTAT) 0.4 MG SL tablet Place 1 tablet under the tongue every 5 minutes as needed for Chest pain up to max of 3 total doses. If no relief after 1 dose, call 911. Qty: 25 tablet, Refills: 3            Discharge Instructions:   Recommended Follow Up:  Momo Moon MD  [address]  [phone]    Schedule an appointment as soon as possible for a visit  Rectal mass pathology 2023: Fragments of villous adenoma, negative for evidence of high-grade dysplasia. [facility] EMERGENCY DEPT  [address]  [phone]  Go to  As needed    Future Appointments Scheduled at Time of Discharge:  Future Appointments   Date Time Provider [address] [place] [address]   2023 11:45 AM ANAT Ventura N PAD HEMONC [place]   2023 11:45 AM SCHEDULE, [facility] MED ONC MA [facility] MED ONC [facility]   2023  2:45 PM Haroon Araya MD [address] Minerva Montero 917 Course:   53-year-old male on aspirin and Plavix admitted to critical care unit for acute blood loss anemia secondary to GI bleed. GI/heme-onc/general surgery following  Rectal mass pathology 2023: Fragments of villous adenoma, negative for evidence of high-grade dysplasia.   CEA 0.6  No further transfusions required  Hemodynamically stable  Aspirin/Plavix remain on hold  S/p EGD/colonoscopy 11/3/2023, reports on file  GI states patient stable for discharge with outpatient follow-up  Gen surgery states patient stable for discharge with outpatient repeat endoscopy per GI  Heme-onc states patient stable for discharge with outpatient follow-up  Heme-onc outpatient follow-up with Morteza Swanson already scheduled    Patient clinically back to baseline  Patient denies any complaints  Patient agreeable for discharge  Patient medically stable for discharge  Patient aware to follow-up with his outpatient providers  Patient aware to return to ER immediately with any concerning signs or symptoms    Discharge Exam:   HEENT: normocephalic  Neck: supple, symmetrical, trachea midline   Lungs: clear to auscultation bilaterally   Cardiovascular: s1 and s2 normal  Abdomen: soft, positive bowel sounds  Extremities: no cyanosis   Neuro: aaox3, no focal deficits     Recent Labs     11/04/23  0620 11/04/23  1211 11/05/23  0707 11/05/23  1233 11/06/23  0351   WBC 5.2  --  4.0*  --  4.8   HGB 8.4*   < > 8.8* 9.2* 8.2*     --  217  --  209    < > = values in this interval not displayed. Recent Labs     11/04/23  0620 11/05/23  0707 11/06/23  0351    143 144   K 3.4* 3.5 3.8    108 106   CO2 25 29 27   BUN 5* 7 11   CREATININE 0.9 0.9 0.8   GLUCOSE 93 104 123*     No results for input(s): \"AST\", \"ALT\", \"ALB\", \"BILITOT\", \"ALKPHOS\" in the last 72 hours. Troponin T: No results for input(s): \"TROPONINI\" in the last 72 hours. Pro-BNP: No results for input(s): \"BNP\" in the last 72 hours. INR: No results for input(s): \"INR\" in the last 72 hours. UA:No results for input(s): \"NITRITE\", \"COLORU\", \"PHUR\", \"LABCAST\", \"WBCUA\", \"RBCUA\", \"MUCUS\", \"TRICHOMONAS\", \"YEAST\", \"BACTERIA\", \"CLARITYU\", \"SPECGRAV\", \"LEUKOCYTESUR\", \"UROBILINOGEN\", \"BILIRUBINUR\", \"BLOODU\", \"GLUCOSEU\", \"AMORPHOUS\" in the last 72 hours.   A1C: No results for

## 2023-11-06 NOTE — PLAN OF CARE
Problem: Discharge Planning  Goal: Discharge to home or other facility with appropriate resources  11/6/2023 1520 by Emilie Schuster RN  Outcome: Completed  11/6/2023 1044 by Emilie Schuster RN  Outcome: Progressing  Flowsheets (Taken 11/6/2023 1037)  Discharge to home or other facility with appropriate resources: Identify barriers to discharge with patient and caregiver     Problem: Safety - Adult  Goal: Free from fall injury  11/6/2023 1520 by Emilie Schuster RN  Outcome: Completed  11/6/2023 1044 by Emilie Schuster RN  Outcome: Progressing     Problem: ABCDS Injury Assessment  Goal: Absence of physical injury  11/6/2023 1520 by Emilie Schuster RN  Outcome: Completed  11/6/2023 1044 by Emilie Schuster RN  Outcome: Progressing  Flowsheets (Taken 11/6/2023 1043)  Absence of Physical Injury: Implement safety measures based on patient assessment     Problem: Skin/Tissue Integrity  Goal: Absence of new skin breakdown  Description: 1. Monitor for areas of redness and/or skin breakdown  2. Assess vascular access sites hourly  3. Every 4-6 hours minimum:  Change oxygen saturation probe site  4. Every 4-6 hours:  If on nasal continuous positive airway pressure, respiratory therapy assess nares and determine need for appliance change or resting period.   11/6/2023 1520 by Emilie Schuster RN  Outcome: Completed  11/6/2023 1044 by Emilie Schuster RN  Outcome: Progressing     Problem: Pain  Goal: Verbalizes/displays adequate comfort level or baseline comfort level  11/6/2023 1520 by Emilie Schuster RN  Outcome: Completed  11/6/2023 1044 by Emilie Schuster RN  Outcome: Progressing

## 2023-11-08 NOTE — PROGRESS NOTES
Physician Progress Note      PATIENT:               Marni ARGUETA #:                  [id number]  :                       1975  ADMIT DATE:       10/30/2023 2:20 PM  [address] [place] [address] DATE:        2023 4:42 PM  RESPONDING  PROVIDER #:        Melissa Rodriguez MD          QUERY TEXT:    Pt admitted with GI bleed. Pt noted to have rectal mass. If possible, please   document in progress notes and discharge summary the relationship, if any,   between GI bleed and rectal mass. The medical record reflects the following:  Risk Factors: Hx of diverticular disease,  Clinical Indicators: Bright red blood per rectum, ABLA, Pt on Plavix on hold. Sigmoidoscopy -  Ongoing lower GI bleeding, probably originating from polypoid   rectal mass. 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 Treatment: GI consult, flexible sigmoidoscopy, colonoscopy pending, 2 units   platelets. Thank you  Jane Mckeon RN, BSN, [facility]  [phone]  Options provided:  -- GI bleed due to polypoid tumor  -- GI bleed unrelated to polypoid tumor  -- Other - I will add my own diagnosis  -- Disagree - Not applicable / Not valid  -- Disagree - Clinically unable to determine / Unknown  -- Refer to Clinical Documentation Reviewer    PROVIDER RESPONSE TEXT:    This patient has GI bleed due to polypoid tumor.     Query created by: Jane Mckeon on 2023 10:43 AM      Electronically signed by:  Melissa Rodriguez MD 2023 9:42 PM

## 2023-11-22 NOTE — PROGRESS NOTES
wheezing or rales. Chest:      Chest wall: No tenderness. Abdominal:      General: Bowel sounds are normal. There is no distension. Palpations: Abdomen is soft. There is no mass. Tenderness: There is no abdominal tenderness. There is no guarding. Genitourinary:     Comments: Exam deferred  Musculoskeletal:         General: No tenderness or deformity. Cervical back: Neck supple. Comments: Normal ROM all four extremities   Lymphadenopathy:      Cervical: No cervical adenopathy. Right cervical: No superficial cervical adenopathy. Left cervical: No superficial cervical adenopathy. Upper Body:      Right upper body: No supraclavicular adenopathy. Left upper body: No supraclavicular adenopathy. Comments: No bulky palpable cervical, clavicular, axillary or inguinal adenopathies on the left or right. Skin:     General: Skin is warm and dry. Findings: No rash. Neurological:      Mental Status: He is alert and oriented to person, place, and time. Comments: follows commands, non-focal   Psychiatric:         Behavior: Behavior normal. Behavior is cooperative. Thought Content: Thought content normal.         Judgment: Judgment normal.      Comments: Alert and oriented to person, place and time.      Labs reviewed/interpreted by me:  CBC:   Lab Results   Component Value Date    WBC 5.98 11/27/2023    HGB 10.1 (L) 11/27/2023    HCT 31.6 (L) 11/27/2023    MCV 87.3 11/27/2023     11/27/2023    LABLYMP 3.25 02/27/2015    LYMPHOPCT 35.1 11/27/2023    RBC 3.62 (L) 11/27/2023    MCH 27.9 11/27/2023    MCHC 32.0 (L) 11/27/2023    RDW 13.2 11/27/2023     Lab Results   Component Value Date    NEUTROABS 3.09 11/27/2023       ASSESSMENT:    #Acute blood loss anemia  -Normal PT/APTT  -Normal platelet count but likely dysfunctional platelets from clopidogrel and aspirin  -Monitor CBC twice daily  -s/p Methylprednisolone 25 mg IV once 11/1/2023  -s/p transfusional

## 2023-11-23 ENCOUNTER — HOSPITAL ENCOUNTER (EMERGENCY)
Age: 48
Discharge: HOME | End: 2023-11-23
Attending: EMERGENCY MEDICINE
Payer: COMMERCIAL

## 2023-11-23 VITALS
DIASTOLIC BLOOD PRESSURE: 87 MMHG | TEMPERATURE: 98.1 F | SYSTOLIC BLOOD PRESSURE: 135 MMHG | RESPIRATION RATE: 18 BRPM | OXYGEN SATURATION: 98 % | BODY MASS INDEX: 27.02 KG/M2 | HEART RATE: 76 BPM | WEIGHT: 183 LBS

## 2023-11-23 DIAGNOSIS — R42 DIZZINESS: Primary | ICD-10-CM

## 2023-11-23 LAB
ALBUMIN SERPL-MCNC: 4.3 G/DL (ref 3.5–5.2)
ALP SERPL-CCNC: 85 U/L (ref 40–130)
ALT SERPL-CCNC: 16 U/L (ref 5–41)
ANION GAP SERPL CALCULATED.3IONS-SCNC: 11 MMOL/L (ref 7–19)
AST SERPL-CCNC: 15 U/L (ref 5–40)
BASOPHILS # BLD: 0.1 K/UL (ref 0–0.2)
BASOPHILS NFR BLD: 1.3 % (ref 0–1)
BILIRUB SERPL-MCNC: 0.4 MG/DL (ref 0.2–1.2)
BUN SERPL-MCNC: 13 MG/DL (ref 6–20)
CALCIUM SERPL-MCNC: 9.2 MG/DL (ref 8.6–10)
CHLORIDE SERPL-SCNC: 105 MMOL/L (ref 98–111)
CO2 SERPL-SCNC: 25 MMOL/L (ref 22–29)
CREAT SERPL-MCNC: 1.1 MG/DL (ref 0.5–1.2)
EOSINOPHIL # BLD: 0.3 K/UL (ref 0–0.6)
EOSINOPHIL NFR BLD: 4.5 % (ref 0–5)
ERYTHROCYTE [DISTWIDTH] IN BLOOD BY AUTOMATED COUNT: 13.3 % (ref 11.5–14.5)
GLUCOSE SERPL-MCNC: 106 MG/DL (ref 74–109)
HCT VFR BLD AUTO: 33.4 % (ref 42–52)
HGB BLD-MCNC: 10.8 G/DL (ref 14–18)
IMM GRANULOCYTES # BLD: 0 K/UL
LYMPHOCYTES # BLD: 2.5 K/UL (ref 1.1–4.5)
LYMPHOCYTES NFR BLD: 42.2 % (ref 20–40)
MCH RBC QN AUTO: 28.6 PG (ref 27–31)
MCHC RBC AUTO-ENTMCNC: 32.3 G/DL (ref 33–37)
MCV RBC AUTO: 88.6 FL (ref 80–94)
MONOCYTES # BLD: 0.4 K/UL (ref 0–0.9)
MONOCYTES NFR BLD: 6.5 % (ref 0–10)
NEUTROPHILS # BLD: 2.7 K/UL (ref 1.5–7.5)
NEUTS SEG NFR BLD: 45.2 % (ref 50–65)
PLATELET # BLD AUTO: 391 K/UL (ref 130–400)
PMV BLD AUTO: 9.5 FL (ref 9.4–12.4)
POTASSIUM SERPL-SCNC: 3.5 MMOL/L (ref 3.5–5)
PROT SERPL-MCNC: 7.1 G/DL (ref 6.6–8.7)
RBC # BLD AUTO: 3.77 M/UL (ref 4.7–6.1)
SODIUM SERPL-SCNC: 141 MMOL/L (ref 136–145)
WBC # BLD AUTO: 6 K/UL (ref 4.8–10.8)

## 2023-11-23 PROCEDURE — 36415 COLL VENOUS BLD VENIPUNCTURE: CPT

## 2023-11-23 PROCEDURE — 80053 COMPREHEN METABOLIC PANEL: CPT

## 2023-11-23 PROCEDURE — 85025 COMPLETE CBC W/AUTO DIFF WBC: CPT

## 2023-11-23 PROCEDURE — 99284 EMERGENCY DEPT VISIT MOD MDM: CPT

## 2023-11-23 ASSESSMENT — ENCOUNTER SYMPTOMS
RHINORRHEA: 0
SHORTNESS OF BREATH: 0
SORE THROAT: 0
ABDOMINAL PAIN: 0
VOMITING: 0
BLOOD IN STOOL: 0
BACK PAIN: 0
NAUSEA: 0
DIARRHEA: 0
COUGH: 0

## 2023-11-23 ASSESSMENT — PAIN - FUNCTIONAL ASSESSMENT: PAIN_FUNCTIONAL_ASSESSMENT: NONE - DENIES PAIN

## 2023-11-23 NOTE — ED PROVIDER NOTES
[facility] EMERGENCY DEPT  eMERGENCY dEPARTMENT eNCOUnter      Pt Name: Bruce Dunn  MRN: [id number]  [address] [facility] 1975  Date of evaluation: 11/23/2023  Provider: Cabrera Thompson MD    [address]       Chief Complaint   Patient presents with    Hypotension     Low bp at home 80s/60s, feeling like he may pass out, HR in the 90s. HISTORY OF PRESENT ILLNESS   (Location/Symptom, Timing/Onset,Context/Setting, Quality, Duration, Modifying Factors, Severity)  Note limiting factors. Bruce Dunn is a 46 y.o. male who presents to the emergency department for hypotension and dizziness. Patient states last night and this morning has had blood pressures in the 80s and 90s feel somewhat lightheaded and dizzy like he could pass out but has not lost consciousness. Denies any chest pain or shortness of breath. Also tells me heart rate usually in the 70s but was in the 90s. He thinks he also could have been having a panic attack potentially. Does have a prior history of GI bleed however denies any bloody or black stools. Hemoglobin is actually improved from prior comparisons. HPI    NursingNotes were reviewed. REVIEW OF SYSTEMS    (2-9 systems for level 4, 10 or more for level 5)     Review of Systems   Constitutional:  Negative for chills and fever. HENT:  Negative for rhinorrhea and sore throat. Respiratory:  Negative for cough and shortness of breath. Cardiovascular:  Negative for chest pain and leg swelling. Gastrointestinal:  Negative for abdominal pain, blood in stool, diarrhea, nausea and vomiting. Genitourinary:  Negative for dysuria and frequency. Musculoskeletal:  Negative for back pain and neck pain. Neurological:  Positive for dizziness and light-headedness. Negative for syncope, facial asymmetry, speech difficulty, weakness, numbness and headaches. Psychiatric/Behavioral:  The patient is nervous/anxious. All other systems reviewed and are negative.            PAST MEDICALHISTORY

## 2023-11-27 ENCOUNTER — HOSPITAL ENCOUNTER (OUTPATIENT)
Dept: INFUSION THERAPY | Age: 48
Discharge: HOME | End: 2023-11-27

## 2023-11-27 ENCOUNTER — OFFICE VISIT (OUTPATIENT)
Dept: HEMATOLOGY | Age: 48
End: 2023-11-27

## 2023-11-27 VITALS
HEIGHT: 69 IN | TEMPERATURE: 97.9 F | DIASTOLIC BLOOD PRESSURE: 74 MMHG | SYSTOLIC BLOOD PRESSURE: 128 MMHG | HEART RATE: 107 BPM | BODY MASS INDEX: 27 KG/M2 | OXYGEN SATURATION: 98 % | WEIGHT: 182.3 LBS

## 2023-11-27 DIAGNOSIS — D62 ACUTE BLOOD LOSS ANEMIA: Primary | ICD-10-CM

## 2023-11-27 DIAGNOSIS — D69.1 ABNORMAL PLATELET FUNCTION (HCC): ICD-10-CM

## 2023-11-27 DIAGNOSIS — D64.9 ANEMIA, UNSPECIFIED TYPE: Primary | ICD-10-CM

## 2023-11-27 DIAGNOSIS — K62.89 RECTAL MASS: ICD-10-CM

## 2023-11-27 DIAGNOSIS — Z71.89 CARE PLAN DISCUSSED WITH PATIENT: ICD-10-CM

## 2023-11-27 DIAGNOSIS — D64.9 ANEMIA, UNSPECIFIED TYPE: ICD-10-CM

## 2023-11-27 LAB
BASOPHILS # BLD: 0.06 K/UL (ref 0.01–0.08)
BASOPHILS NFR BLD: 1 % (ref 0.1–1.2)
EKG P AXIS: 55 DEGREES
EKG P-R INTERVAL: 158 MS
EKG Q-T INTERVAL: 376 MS
EKG QRS DURATION: 88 MS
EKG QTC CALCULATION (BAZETT): 393 MS
EKG T AXIS: 47 DEGREES
EOSINOPHIL # BLD: 0.32 K/UL (ref 0.04–0.54)
EOSINOPHIL NFR BLD: 5.4 % (ref 0.7–7)
ERYTHROCYTE [DISTWIDTH] IN BLOOD BY AUTOMATED COUNT: 13.2 % (ref 11.6–14.4)
HCT VFR BLD AUTO: 31.6 % (ref 40.1–51)
HGB BLD-MCNC: 10.1 G/DL (ref 13.7–17.5)
LYMPHOCYTES # BLD: 2.1 K/UL (ref 1.18–3.74)
LYMPHOCYTES NFR BLD: 35.1 % (ref 19.3–53.1)
MCH RBC QN AUTO: 27.9 PG (ref 25.7–32.2)
MCHC RBC AUTO-ENTMCNC: 32 G/DL (ref 32.3–36.5)
MCV RBC AUTO: 87.3 FL (ref 79–92.2)
MONOCYTES # BLD: 0.4 K/UL (ref 0.24–0.82)
MONOCYTES NFR BLD: 6.7 % (ref 4.7–12.5)
NEUTROPHILS # BLD: 3.09 K/UL (ref 1.56–6.13)
NEUTS SEG NFR BLD: 51.6 % (ref 34–71.1)
PLATELET # BLD AUTO: 311 K/UL (ref 163–337)
PMV BLD AUTO: 9.1 FL (ref 7.4–10.4)
RBC # BLD AUTO: 3.62 M/UL (ref 4.63–6.08)
WBC # BLD AUTO: 5.98 K/UL (ref 4.23–9.07)

## 2023-11-27 PROCEDURE — 99214 OFFICE O/P EST MOD 30 MIN: CPT | Performed by: NURSE PRACTITIONER

## 2023-11-27 PROCEDURE — 85025 COMPLETE CBC W/AUTO DIFF WBC: CPT

## 2023-11-27 PROCEDURE — 99212 OFFICE O/P EST SF 10 MIN: CPT

## 2023-11-27 PROCEDURE — 36415 COLL VENOUS BLD VENIPUNCTURE: CPT

## 2023-11-29 ASSESSMENT — ENCOUNTER SYMPTOMS
NAUSEA: 0
COUGH: 0
SORE THROAT: 0
SHORTNESS OF BREATH: 0
EYE DISCHARGE: 0
VOMITING: 0
WHEEZING: 0
BLOOD IN STOOL: 1
EYE ITCHING: 0
DIARRHEA: 0
TROUBLE SWALLOWING: 0
CONSTIPATION: 0
ABDOMINAL PAIN: 0

## 2023-12-01 ENCOUNTER — OFFICE VISIT (OUTPATIENT)
Dept: CARDIOLOGY CLINIC | Age: 48
End: 2023-12-01

## 2023-12-01 VITALS
HEART RATE: 79 BPM | DIASTOLIC BLOOD PRESSURE: 78 MMHG | WEIGHT: 180 LBS | SYSTOLIC BLOOD PRESSURE: 128 MMHG | BODY MASS INDEX: 26.66 KG/M2 | OXYGEN SATURATION: 99 % | HEIGHT: 69 IN

## 2023-12-01 DIAGNOSIS — I21.4 NSTEMI (NON-ST ELEVATED MYOCARDIAL INFARCTION) (HCC): Primary | ICD-10-CM

## 2023-12-01 DIAGNOSIS — K92.2 LOWER GI BLEED: ICD-10-CM

## 2023-12-01 PROCEDURE — 99214 OFFICE O/P EST MOD 30 MIN: CPT | Performed by: CLINICAL NURSE SPECIALIST

## 2023-12-01 NOTE — PATIENT INSTRUCTIONS
Maintain good blood pressure control-goal<130/80 at rest  Maintain good cholesterol control LDL goal<70 with arterial disease  If you are diabetic work to keep/obtain hemoglobin A1c< 7    Follow up in 6 mos  Call with any questions or concerns  Follow up with Jasen Smith MD for non cardiac problems  Report any new problems  Cardiovascular Fitness-Exercise as tolerated. Strive for 30 minutes of exercise most days of the week. Cardiac / Healthy Diet- Avoid processed high fat foods, maintain low sodium/salt   Continue current medications as directed  Continue plan of treatment  It is always recommended that you bring your medications bottles with you to each visit - this is for your safety!

## 2023-12-01 NOTE — PROGRESS NOTES
[facility] Cardiology  [address] [facility], [address] [facility]  Phone: [phone]  Fax: [phone]    OFFICE VISIT:  2023    Sona Jackson - : 1975    Reason For Visit:  Flory Nguyen is a 46 y.o. male who is here for Follow-up (No symptoms), Chest Pain, and Follow-Up from Hospital  Patient presented to the emergency room 10/9/2023 with chest pain with diaphoresis. It was admitted. Echo showed overall normal heart function and normal heart valves. Troponins trended upward and patient had heart catheterization 10/10/2023. Showing normal LV systolic function. Minimal luminal irregularities. Recommended ongoing medical management with aspirin Plavix and statin therapy. On 10/30/2023 patient was admitted to critical care unit for acute blood loss with GI bleeding. Colonoscopy performed showing rectal mass. He required platelet and packed red blood cells transfusion. DAPT was held. Was seen in the emergency room on 10/23/2023 for hypotension and dizziness. No acute findings. Improving H&H and symptoms. Discharged home. He returns today in follow-up      He is seeing Dr Clemente Zimmer today for options for large polyp removal    No more bleeding   Off meds now. Feeling better      Subjective  Flory Nguyen denies exertional chest pain, shortness of breath, orthopnea, paroxysmal nocturnal dyspnea, syncope, presyncope, arrhythmia, edema and fatigue. The patient denies numbness or weakness to suggest cerebrovascular accident or transient ischemic attack. Sherita Austin MD is PCP and follows labs .   Sona Jackson has the following history as recorded in [facility]:    Patient Active Problem List    Diagnosis Date Noted    Abnormal platelet aggregation ([address]) 2023    Severe anemia 2023    Symptomatic anemia 2023    Lower GI bleed 10/30/2023    Acute GI bleeding 10/30/2023    GI bleed 10/30/2023    NSTEMI (non-ST elevated myocardial infarction) ([address]) 10/09/2023     Past Medical History:   Diagnosis

## 2023-12-07 ENCOUNTER — TRANSCRIBE ORDERS (OUTPATIENT)
Dept: ADMINISTRATIVE | Facility: HOSPITAL | Age: 48
End: 2023-12-07
Payer: COMMERCIAL

## 2023-12-07 DIAGNOSIS — D37.8 NEOPLASM OF UNCERTAIN BEHAVIOR OF STOMACH, INTESTINES, AND RECTUM: ICD-10-CM

## 2023-12-07 DIAGNOSIS — K62.1 RECTAL POLYP: Primary | ICD-10-CM

## 2023-12-07 DIAGNOSIS — D37.1 NEOPLASM OF UNCERTAIN BEHAVIOR OF STOMACH, INTESTINES, AND RECTUM: ICD-10-CM

## 2023-12-07 DIAGNOSIS — D37.5 NEOPLASM OF UNCERTAIN BEHAVIOR OF STOMACH, INTESTINES, AND RECTUM: ICD-10-CM

## 2024-01-02 ENCOUNTER — HOSPITAL ENCOUNTER (OUTPATIENT)
Dept: MRI IMAGING | Facility: HOSPITAL | Age: 49
Discharge: HOME | End: 2024-01-02
Payer: COMMERCIAL

## 2024-01-03 NOTE — PROGRESS NOTES
dysfunctional platelets from clopidogrel and aspirin  -s/p Methylprednisolone 25 mg IV once 11/1/2023  -s/p transfusional support PRBC 11/3/2023  -Flexible sigmoidoscopy-findings of a rectal mass  -Await rectal protocol MRI and further plans per Dr. Perez     Hgb stable, 10.3/MCV 82.8     Repeat iron studies today, replace parenterally as needed     #Abnormal platelet function-patient was on aspirin and Plavix    -Transfused 1 unit platelets on 11/1/2023, 11/2/2023 and 11/3/2023 due to active bleeding  -S/p Methylprednisolone 25 mg IV once 11/1/2023  -Von Willebrand panel was negative      #Rectal mass-         11/1/2023 Pathology - Sigmoidoscopy  Colon, biopsies of polypoid mass at 10 cm: Fragments of villous adenoma, negative for evidence of high-grade dysplasia.     COMMENT: If the biopsy specimens are part of a larger mass, it is not possible to definitively exclude a more significant adjacent or underlying lesion. Clinical correlation is suggested.     CBG/CBG    Colonoscopy 11/3/2023  FINDINGS:  1.  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  2.  1 cm benign polyp ablated at 68 cm in the distal transverse colon.     CEA 11/2/2023: 0.6      Plavix and aspirin were discontinued pending further surgical plans  Patient was evaluated by Dr. Rickie Perez 12/1/2023.  Plans for rectal protocol MRI and follow-up with Dr. Perez thereafter for possible TAMIS procedure pending results of MRI     #Care plan discussed with patient  All questions answered    Orders Placed This Encounter   Procedures    Iron and TIBC    Ferritin    CBC w/diff     (Please note that portions of this note were completed with a voice recognition program. Efforts were made to edit the dictations but occasionally words are mis-transcribed.)     Return in about 6 weeks (around 2/15/2024) for follow up with ANAT Zapata.         ANAT Odell  3:38 PM  1/6/2024

## 2024-01-04 ENCOUNTER — HOSPITAL ENCOUNTER (OUTPATIENT)
Dept: INFUSION THERAPY | Age: 49
Discharge: HOME | End: 2024-01-04
Payer: COMMERCIAL

## 2024-01-04 ENCOUNTER — OFFICE VISIT (OUTPATIENT)
Dept: HEMATOLOGY | Age: 49
End: 2024-01-04
Payer: COMMERCIAL

## 2024-01-04 VITALS
SYSTOLIC BLOOD PRESSURE: 128 MMHG | HEIGHT: 69 IN | BODY MASS INDEX: 26.36 KG/M2 | OXYGEN SATURATION: 99 % | WEIGHT: 178 LBS | TEMPERATURE: 98.6 F | DIASTOLIC BLOOD PRESSURE: 82 MMHG | HEART RATE: 83 BPM

## 2024-01-04 DIAGNOSIS — D62 ACUTE BLOOD LOSS ANEMIA: Primary | ICD-10-CM

## 2024-01-04 DIAGNOSIS — D64.9 ANEMIA, UNSPECIFIED TYPE: ICD-10-CM

## 2024-01-04 DIAGNOSIS — Z71.89 CARE PLAN DISCUSSED WITH PATIENT: ICD-10-CM

## 2024-01-04 DIAGNOSIS — K62.89 RECTAL MASS: ICD-10-CM

## 2024-01-04 DIAGNOSIS — D69.1 ABNORMAL PLATELET FUNCTION (HCC): ICD-10-CM

## 2024-01-04 DIAGNOSIS — D62 ACUTE BLOOD LOSS ANEMIA: ICD-10-CM

## 2024-01-04 LAB
BASOPHILS # BLD: 0.08 K/UL (ref 0.01–0.08)
BASOPHILS NFR BLD: 1.3 % (ref 0.1–1.2)
EOSINOPHIL # BLD: 0.28 K/UL (ref 0.04–0.54)
EOSINOPHIL NFR BLD: 4.4 % (ref 0.7–7)
ERYTHROCYTE [DISTWIDTH] IN BLOOD BY AUTOMATED COUNT: 14.3 % (ref 11.6–14.4)
FERRITIN SERPL-MCNC: 8.3 NG/ML (ref 30–400)
HCT VFR BLD AUTO: 34.2 % (ref 40.1–51)
HGB BLD-MCNC: 10.3 G/DL (ref 13.7–17.5)
IRON SATN MFR SERPL: 9 % (ref 14–50)
IRON SERPL-MCNC: 38 UG/DL (ref 59–158)
LYMPHOCYTES # BLD: 1.95 K/UL (ref 1.18–3.74)
LYMPHOCYTES NFR BLD: 31 % (ref 19.3–53.1)
MCH RBC QN AUTO: 24.9 PG (ref 25.7–32.2)
MCHC RBC AUTO-ENTMCNC: 30.1 G/DL (ref 32.3–36.5)
MCV RBC AUTO: 82.8 FL (ref 79–92.2)
MONOCYTES # BLD: 0.43 K/UL (ref 0.24–0.82)
MONOCYTES NFR BLD: 6.8 % (ref 4.7–12.5)
NEUTROPHILS # BLD: 3.55 K/UL (ref 1.56–6.13)
NEUTS SEG NFR BLD: 56.3 % (ref 34–71.1)
PLATELET # BLD AUTO: 293 K/UL (ref 163–337)
PMV BLD AUTO: 9.1 FL (ref 7.4–10.4)
RBC # BLD AUTO: 4.13 M/UL (ref 4.63–6.08)
TIBC SERPL-MCNC: 401 UG/DL (ref 250–400)
WBC # BLD AUTO: 6.3 K/UL (ref 4.23–9.07)

## 2024-01-04 PROCEDURE — 85025 COMPLETE CBC W/AUTO DIFF WBC: CPT

## 2024-01-04 PROCEDURE — 36415 COLL VENOUS BLD VENIPUNCTURE: CPT

## 2024-01-04 PROCEDURE — 99214 OFFICE O/P EST MOD 30 MIN: CPT | Performed by: NURSE PRACTITIONER

## 2024-01-04 PROCEDURE — 99212 OFFICE O/P EST SF 10 MIN: CPT

## 2024-01-04 RX ORDER — ALPRAZOLAM 0.5 MG/1
TABLET ORAL
COMMUNITY
Start: 2023-12-11

## 2024-01-06 ENCOUNTER — APPOINTMENT (OUTPATIENT)
Dept: GENERAL RADIOLOGY | Age: 49
End: 2024-01-06

## 2024-01-06 ENCOUNTER — HOSPITAL ENCOUNTER (EMERGENCY)
Age: 49
Discharge: HOME | End: 2024-01-06
Attending: EMERGENCY MEDICINE

## 2024-01-06 ENCOUNTER — CLINICAL DOCUMENTATION (OUTPATIENT)
Dept: HEMATOLOGY | Age: 49
End: 2024-01-06

## 2024-01-06 VITALS
RESPIRATION RATE: 16 BRPM | SYSTOLIC BLOOD PRESSURE: 106 MMHG | BODY MASS INDEX: 27.11 KG/M2 | DIASTOLIC BLOOD PRESSURE: 74 MMHG | HEIGHT: 69 IN | TEMPERATURE: 98 F | HEART RATE: 79 BPM | OXYGEN SATURATION: 99 % | WEIGHT: 183 LBS

## 2024-01-06 DIAGNOSIS — I25.2 HISTORY OF NON-ST ELEVATION MYOCARDIAL INFARCTION (NSTEMI): ICD-10-CM

## 2024-01-06 DIAGNOSIS — D64.9 CHRONIC ANEMIA: ICD-10-CM

## 2024-01-06 DIAGNOSIS — R07.9 CHEST PAIN, UNSPECIFIED TYPE: Primary | ICD-10-CM

## 2024-01-06 LAB
ALBUMIN SERPL-MCNC: 3.5 G/DL (ref 3.5–5.2)
ALP SERPL-CCNC: 67 U/L (ref 40–130)
ALT SERPL-CCNC: 20 U/L (ref 5–41)
ANION GAP SERPL CALCULATED.3IONS-SCNC: 7 MMOL/L (ref 7–19)
AST SERPL-CCNC: 13 U/L (ref 5–40)
BASOPHILS # BLD: 0.1 K/UL (ref 0–0.2)
BASOPHILS NFR BLD: 1.3 % (ref 0–1)
BILIRUB SERPL-MCNC: <0.2 MG/DL (ref 0.2–1.2)
BUN SERPL-MCNC: 15 MG/DL (ref 6–20)
CALCIUM SERPL-MCNC: 8.5 MG/DL (ref 8.6–10)
CHLORIDE SERPL-SCNC: 109 MMOL/L (ref 98–111)
CO2 SERPL-SCNC: 27 MMOL/L (ref 22–29)
CREAT SERPL-MCNC: 0.9 MG/DL (ref 0.5–1.2)
EOSINOPHIL # BLD: 0.3 K/UL (ref 0–0.6)
EOSINOPHIL NFR BLD: 5.2 % (ref 0–5)
ERYTHROCYTE [DISTWIDTH] IN BLOOD BY AUTOMATED COUNT: 14.3 % (ref 11.5–14.5)
GLUCOSE SERPL-MCNC: 103 MG/DL (ref 74–109)
HCT VFR BLD AUTO: 31.5 % (ref 42–52)
HGB BLD-MCNC: 9.8 G/DL (ref 14–18)
IMM GRANULOCYTES # BLD: 0 K/UL
LIPASE SERPL-CCNC: 27 U/L (ref 13–60)
LYMPHOCYTES # BLD: 1.5 K/UL (ref 1.1–4.5)
LYMPHOCYTES NFR BLD: 28.5 % (ref 20–40)
MAGNESIUM SERPL-MCNC: 2.1 MG/DL (ref 1.6–2.6)
MCH RBC QN AUTO: 26.1 PG (ref 27–31)
MCHC RBC AUTO-ENTMCNC: 31.1 G/DL (ref 33–37)
MCV RBC AUTO: 84 FL (ref 80–94)
MONOCYTES # BLD: 0.3 K/UL (ref 0–0.9)
MONOCYTES NFR BLD: 6.3 % (ref 0–10)
NEUTROPHILS # BLD: 3 K/UL (ref 1.5–7.5)
NEUTS SEG NFR BLD: 58.5 % (ref 50–65)
PLATELET # BLD AUTO: 262 K/UL (ref 130–400)
PMV BLD AUTO: 9.7 FL (ref 9.4–12.4)
POTASSIUM SERPL-SCNC: 4.1 MMOL/L (ref 3.5–5)
PROT SERPL-MCNC: 5.9 G/DL (ref 6.6–8.7)
RBC # BLD AUTO: 3.75 M/UL (ref 4.7–6.1)
SODIUM SERPL-SCNC: 143 MMOL/L (ref 136–145)
TROPONIN, HIGH SENSITIVITY: <6 NG/L (ref 0–22)
TROPONIN, HIGH SENSITIVITY: <6 NG/L (ref 0–22)
WBC # BLD AUTO: 5.2 K/UL (ref 4.8–10.8)

## 2024-01-06 PROCEDURE — 71045 X-RAY EXAM CHEST 1 VIEW: CPT

## 2024-01-06 PROCEDURE — 84484 ASSAY OF TROPONIN QUANT: CPT

## 2024-01-06 PROCEDURE — 83690 ASSAY OF LIPASE: CPT

## 2024-01-06 PROCEDURE — 99285 EMERGENCY DEPT VISIT HI MDM: CPT

## 2024-01-06 PROCEDURE — 80053 COMPREHEN METABOLIC PANEL: CPT

## 2024-01-06 PROCEDURE — 93005 ELECTROCARDIOGRAM TRACING: CPT | Performed by: EMERGENCY MEDICINE

## 2024-01-06 PROCEDURE — 85025 COMPLETE CBC W/AUTO DIFF WBC: CPT

## 2024-01-06 PROCEDURE — 36415 COLL VENOUS BLD VENIPUNCTURE: CPT

## 2024-01-06 PROCEDURE — 83735 ASSAY OF MAGNESIUM: CPT

## 2024-01-06 ASSESSMENT — ENCOUNTER SYMPTOMS
VOMITING: 0
ABDOMINAL PAIN: 0
SHORTNESS OF BREATH: 0
CONSTIPATION: 0
EYE DISCHARGE: 0
WHEEZING: 0
GASTROINTESTINAL NEGATIVE: 1
EYE ITCHING: 0
SORE THROAT: 0
TROUBLE SWALLOWING: 0
DIARRHEA: 0
EYES NEGATIVE: 1
RESPIRATORY NEGATIVE: 1
COUGH: 0
BLOOD IN STOOL: 0
NAUSEA: 0

## 2024-01-06 NOTE — ED PROVIDER NOTES
[facility] EMERGENCY DEPT  EMERGENCY DEPARTMENT ENCOUNTER      Pt Name: Vu Ying  MRN: [id number]  Birthdate 1975  Date of evaluation: 1/6/2024  Provider: Vu Zhao Jr, MD    CHIEF COMPLAINT       Chief Complaint   Patient presents with    Chest Pain     Pt woke up with chest pressure.  He states it is better now.         HISTORY OF PRESENT ILLNESS   (Location/Symptom, Timing/Onset,Context/Setting, Quality, Duration, Modifying Factors, Severity)  Note limiting factors.   Vu Ying is a 48 y.o. male who presents to the emergency department for evaluation after he noticed he was having chest pressure this morning. Started while he was still laying in bed after waking up this morning.   Has h/o prior NSTEMI in October this year, says pain was similar but not as severe.  Received nitro with EMS and pain resolved after that.   No other symptoms or recent illnesses.     Did have complication of GI bleeding following the hospitalization for the MI.  Has not noticed any blood in his stools recently.  Hemoglobin has been stable but still low since then.      HPI    NursingNotes were reviewed.    REVIEW OF SYSTEMS    (2-9 systems for level 4, 10 or more for level 5)     Review of Systems   Constitutional: Negative.    HENT: Negative.     Eyes: Negative.    Respiratory: Negative.     Cardiovascular:  Positive for chest pain.   Gastrointestinal: Negative.    Genitourinary: Negative.    Musculoskeletal: Negative.    Skin: Negative.    Neurological: Negative.    Hematological: Negative.    Psychiatric/Behavioral: Negative.         A complete review of systems was performed and is negative except as noted above in the HPI.       PAST MEDICAL HISTORY     Past Medical History:   Diagnosis Date    Anemia     Anxiety     Colon polyps     History of blood transfusion     Rectal mass          SURGICAL HISTORY       Past Surgical History:   Procedure Laterality Date    CARDIAC SURGERY  10/09/2023    Heart cath-[facility]    COLONOSCOPY

## 2024-01-06 NOTE — PROGRESS NOTES
Iron studies 1/4/2024 are consistent with deficiency.  Will ask RN to arrange parenteral iron replacement with Venofer IV over 3 doses to total 1000 mg.    ANAT Odell  01/06/24  3:43 PM

## 2024-01-08 DIAGNOSIS — D50.8 OTHER IRON DEFICIENCY ANEMIA: Primary | ICD-10-CM

## 2024-01-08 DIAGNOSIS — K90.9 IRON MALABSORPTION: ICD-10-CM

## 2024-01-08 PROBLEM — D50.9 IRON DEFICIENCY ANEMIA, UNSPECIFIED: Status: ACTIVE | Noted: 2024-01-08

## 2024-01-08 RX ORDER — ACETAMINOPHEN 325 MG/1
650 TABLET ORAL
OUTPATIENT
Start: 2024-01-15

## 2024-01-08 RX ORDER — FAMOTIDINE 10 MG/ML
20 INJECTION, SOLUTION INTRAVENOUS
OUTPATIENT
Start: 2024-01-15

## 2024-01-08 RX ORDER — ALBUTEROL SULFATE 90 UG/1
4 AEROSOL, METERED RESPIRATORY (INHALATION) PRN
OUTPATIENT
Start: 2024-01-15

## 2024-01-08 RX ORDER — ONDANSETRON 2 MG/ML
8 INJECTION INTRAMUSCULAR; INTRAVENOUS
OUTPATIENT
Start: 2024-01-15

## 2024-01-08 RX ORDER — DIPHENHYDRAMINE HYDROCHLORIDE 50 MG/ML
50 INJECTION INTRAMUSCULAR; INTRAVENOUS
OUTPATIENT
Start: 2024-01-15

## 2024-01-08 RX ORDER — SODIUM CHLORIDE 9 MG/ML
INJECTION, SOLUTION INTRAVENOUS CONTINUOUS
OUTPATIENT
Start: 2024-01-15

## 2024-01-08 RX ORDER — SODIUM CHLORIDE 0.9 % (FLUSH) 0.9 %
5-40 SYRINGE (ML) INJECTION PRN
OUTPATIENT
Start: 2024-01-15

## 2024-01-08 RX ORDER — SODIUM CHLORIDE 9 MG/ML
5-250 INJECTION, SOLUTION INTRAVENOUS PRN
OUTPATIENT
Start: 2024-01-15

## 2024-01-08 RX ORDER — EPINEPHRINE 1 MG/ML
0.3 INJECTION, SOLUTION, CONCENTRATE INTRAVENOUS PRN
OUTPATIENT
Start: 2024-01-15

## 2024-01-09 LAB
EKG P AXIS: 70 DEGREES
EKG P-R INTERVAL: 172 MS
EKG Q-T INTERVAL: 376 MS
EKG QRS DURATION: 84 MS
EKG QTC CALCULATION (BAZETT): 375 MS
EKG T AXIS: 64 DEGREES

## 2024-01-09 PROCEDURE — 93010 ELECTROCARDIOGRAM REPORT: CPT | Performed by: INTERNAL MEDICINE

## 2024-01-12 ENCOUNTER — APPOINTMENT (OUTPATIENT)
Dept: CT IMAGING | Age: 49
End: 2024-01-12
Payer: COMMERCIAL

## 2024-01-12 ENCOUNTER — HOSPITAL ENCOUNTER (EMERGENCY)
Age: 49
Discharge: HOME | End: 2024-01-12
Payer: COMMERCIAL

## 2024-01-12 VITALS
WEIGHT: 183 LBS | SYSTOLIC BLOOD PRESSURE: 114 MMHG | HEART RATE: 70 BPM | BODY MASS INDEX: 27.02 KG/M2 | OXYGEN SATURATION: 99 % | TEMPERATURE: 97.5 F | RESPIRATION RATE: 18 BRPM | DIASTOLIC BLOOD PRESSURE: 84 MMHG

## 2024-01-12 DIAGNOSIS — K92.1 HEMATOCHEZIA: Primary | ICD-10-CM

## 2024-01-12 DIAGNOSIS — K64.8 INTERNAL HEMORRHOIDS: ICD-10-CM

## 2024-01-12 LAB
ALBUMIN SERPL-MCNC: 4.3 G/DL (ref 3.5–5.2)
ALP SERPL-CCNC: 78 U/L (ref 40–130)
ALT SERPL-CCNC: 18 U/L (ref 5–41)
ANION GAP SERPL CALCULATED.3IONS-SCNC: 7 MMOL/L (ref 7–19)
APTT PPP: 29.1 SEC (ref 26–36.2)
AST SERPL-CCNC: 15 U/L (ref 5–40)
BASOPHILS # BLD: 0.1 K/UL (ref 0–0.2)
BASOPHILS NFR BLD: 1.3 % (ref 0–1)
BILIRUB SERPL-MCNC: <0.2 MG/DL (ref 0.2–1.2)
BUN SERPL-MCNC: 16 MG/DL (ref 6–20)
CALCIUM SERPL-MCNC: 9.4 MG/DL (ref 8.6–10)
CHLORIDE SERPL-SCNC: 106 MMOL/L (ref 98–111)
CO2 SERPL-SCNC: 29 MMOL/L (ref 22–29)
CREAT SERPL-MCNC: 1 MG/DL (ref 0.5–1.2)
EOSINOPHIL # BLD: 0.3 K/UL (ref 0–0.6)
EOSINOPHIL NFR BLD: 4.8 % (ref 0–5)
ERYTHROCYTE [DISTWIDTH] IN BLOOD BY AUTOMATED COUNT: 14.4 % (ref 11.5–14.5)
GLUCOSE SERPL-MCNC: 82 MG/DL (ref 74–109)
HCT VFR BLD AUTO: 38.2 % (ref 42–52)
HGB BLD-MCNC: 11.4 G/DL (ref 14–18)
IMM GRANULOCYTES # BLD: 0 K/UL
INR PPP: 1.07 (ref 0.88–1.18)
LIPASE SERPL-CCNC: 34 U/L (ref 13–60)
LYMPHOCYTES # BLD: 1.7 K/UL (ref 1.1–4.5)
LYMPHOCYTES NFR BLD: 28.9 % (ref 20–40)
MCH RBC QN AUTO: 25.1 PG (ref 27–31)
MCHC RBC AUTO-ENTMCNC: 29.8 G/DL (ref 33–37)
MCV RBC AUTO: 84 FL (ref 80–94)
MONOCYTES # BLD: 0.5 K/UL (ref 0–0.9)
MONOCYTES NFR BLD: 7.5 % (ref 0–10)
NEUTROPHILS # BLD: 3.4 K/UL (ref 1.5–7.5)
NEUTS SEG NFR BLD: 57.3 % (ref 50–65)
PLATELET # BLD AUTO: 315 K/UL (ref 130–400)
PMV BLD AUTO: 9.5 FL (ref 9.4–12.4)
POTASSIUM SERPL-SCNC: 4.4 MMOL/L (ref 3.5–5)
PROT SERPL-MCNC: 7.3 G/DL (ref 6.6–8.7)
PROTHROMBIN TIME: 13.6 SEC (ref 12–14.6)
RBC # BLD AUTO: 4.55 M/UL (ref 4.7–6.1)
SODIUM SERPL-SCNC: 142 MMOL/L (ref 136–145)
WBC # BLD AUTO: 6 K/UL (ref 4.8–10.8)

## 2024-01-12 PROCEDURE — 36415 COLL VENOUS BLD VENIPUNCTURE: CPT

## 2024-01-12 PROCEDURE — 80053 COMPREHEN METABOLIC PANEL: CPT

## 2024-01-12 PROCEDURE — 83690 ASSAY OF LIPASE: CPT

## 2024-01-12 PROCEDURE — 99285 EMERGENCY DEPT VISIT HI MDM: CPT

## 2024-01-12 PROCEDURE — 6360000004 HC RX CONTRAST MEDICATION: Performed by: PHYSICIAN ASSISTANT

## 2024-01-12 PROCEDURE — 74177 CT ABD & PELVIS W/CONTRAST: CPT

## 2024-01-12 PROCEDURE — 85025 COMPLETE CBC W/AUTO DIFF WBC: CPT

## 2024-01-12 PROCEDURE — 85610 PROTHROMBIN TIME: CPT

## 2024-01-12 PROCEDURE — 85730 THROMBOPLASTIN TIME PARTIAL: CPT

## 2024-01-12 RX ORDER — HYDROCORTISONE ACETATE 25 MG/1
25 SUPPOSITORY RECTAL 2 TIMES DAILY
Qty: 20 SUPPOSITORY | Refills: 0 | Status: SHIPPED | OUTPATIENT
Start: 2024-01-12

## 2024-01-12 RX ADMIN — IOPAMIDOL 70 ML: 755 INJECTION, SOLUTION INTRAVENOUS at 14:05

## 2024-01-12 ASSESSMENT — ENCOUNTER SYMPTOMS
EYE ITCHING: 0
BLOOD IN STOOL: 1
PHOTOPHOBIA: 0
SHORTNESS OF BREATH: 0
COLOR CHANGE: 0
APNEA: 0
COUGH: 0
EYE DISCHARGE: 0
BACK PAIN: 0

## 2024-01-12 NOTE — ED PROVIDER NOTES
[facility] EMERGENCY DEPT  eMERGENCYdEPARTMENT eNCOUnter      Pt Name: Vu Ying  MRN: [id number]  Birthdate 1975  Date of evaluation: 1/12/2024  Provider:AMANDA Obrien    CHIEF COMPLAINT       Chief Complaint   Patient presents with    Rectal Bleeding         HISTORY OF PRESENT ILLNESS  (Location/Symptom, Timing/Onset, Context/Setting, Quality, Duration, Modifying Factors, Severity.)   Vu Ying is a 48 y.o. male who presents to the emergency department with complaints of profuse rectal bleeding he is not currently on thinners he has a cardiac history.  At one time he had been on Plavix.  Patient denies any abdominal pain he has a known \"large polyp\" diagnosed by Dr. Zach Tai with local GI on colonoscopy he is supposed to see Dr. Perez to get a plan of surgical approach on dealing with the polyp.  He had some spotting after BM on Monday that resolved but then today has had multiple episodes.  He has had to have blood transfusion before and feels fatigued and also feels that he is pale but denies any significant abdominal pain. Denies hemorrhoid or rectal or recent painful BM.    HPI    Nursing Notes were reviewed and I agree.    REVIEW OF SYSTEMS    (2-9 systems for level 4, 10 or more for level 5)     Review of Systems   Constitutional:  Negative for activity change, appetite change, chills and fever.   HENT:  Negative for congestion and dental problem.    Eyes:  Negative for photophobia, discharge and itching.   Respiratory:  Negative for apnea, cough and shortness of breath.    Cardiovascular:  Negative for chest pain.   Gastrointestinal:  Positive for blood in stool.   Musculoskeletal:  Negative for arthralgias, back pain, gait problem, myalgias and neck pain.   Skin:  Negative for color change, pallor and rash.   Neurological:  Negative for dizziness, seizures and syncope.   Psychiatric/Behavioral:  Negative for agitation. The patient is not nervous/anxious.         Except as noted above the remainder of

## 2024-01-29 ENCOUNTER — HOSPITAL ENCOUNTER (OUTPATIENT)
Dept: INFUSION THERAPY | Age: 49
Setting detail: INFUSION SERIES
Discharge: HOME | End: 2024-01-29
Payer: COMMERCIAL

## 2024-01-29 VITALS
DIASTOLIC BLOOD PRESSURE: 73 MMHG | OXYGEN SATURATION: 100 % | SYSTOLIC BLOOD PRESSURE: 116 MMHG | RESPIRATION RATE: 18 BRPM | TEMPERATURE: 97.4 F | HEART RATE: 67 BPM

## 2024-01-29 DIAGNOSIS — K90.9 IRON MALABSORPTION: Primary | ICD-10-CM

## 2024-01-29 DIAGNOSIS — D50.8 OTHER IRON DEFICIENCY ANEMIA: ICD-10-CM

## 2024-01-29 PROCEDURE — 2580000003 HC RX 258: Performed by: NURSE PRACTITIONER

## 2024-01-29 PROCEDURE — 96366 THER/PROPH/DIAG IV INF ADDON: CPT

## 2024-01-29 PROCEDURE — 6360000002 HC RX W HCPCS: Performed by: NURSE PRACTITIONER

## 2024-01-29 PROCEDURE — 96365 THER/PROPH/DIAG IV INF INIT: CPT

## 2024-01-29 RX ORDER — ONDANSETRON 2 MG/ML
8 INJECTION INTRAMUSCULAR; INTRAVENOUS
OUTPATIENT
Start: 2024-02-12

## 2024-01-29 RX ORDER — ACETAMINOPHEN 325 MG/1
650 TABLET ORAL
OUTPATIENT
Start: 2024-02-12

## 2024-01-29 RX ORDER — SODIUM CHLORIDE 9 MG/ML
5-250 INJECTION, SOLUTION INTRAVENOUS PRN
Status: DISCONTINUED | OUTPATIENT
Start: 2024-01-29 | End: 2024-01-30 | Stop reason: HOSPADM

## 2024-01-29 RX ORDER — SODIUM CHLORIDE 9 MG/ML
5-250 INJECTION, SOLUTION INTRAVENOUS PRN
OUTPATIENT
Start: 2024-02-12

## 2024-01-29 RX ORDER — SODIUM CHLORIDE 0.9 % (FLUSH) 0.9 %
5-40 SYRINGE (ML) INJECTION PRN
OUTPATIENT
Start: 2024-02-12

## 2024-01-29 RX ORDER — DIPHENHYDRAMINE HYDROCHLORIDE 50 MG/ML
50 INJECTION INTRAMUSCULAR; INTRAVENOUS
OUTPATIENT
Start: 2024-02-12

## 2024-01-29 RX ORDER — SODIUM CHLORIDE 9 MG/ML
INJECTION, SOLUTION INTRAVENOUS CONTINUOUS
OUTPATIENT
Start: 2024-02-12

## 2024-01-29 RX ORDER — SODIUM CHLORIDE 0.9 % (FLUSH) 0.9 %
5-40 SYRINGE (ML) INJECTION PRN
Status: DISCONTINUED | OUTPATIENT
Start: 2024-01-29 | End: 2024-01-30 | Stop reason: HOSPADM

## 2024-01-29 RX ORDER — EPINEPHRINE 1 MG/ML
0.3 INJECTION, SOLUTION, CONCENTRATE INTRAVENOUS PRN
OUTPATIENT
Start: 2024-02-12

## 2024-01-29 RX ORDER — ALBUTEROL SULFATE 90 UG/1
4 AEROSOL, METERED RESPIRATORY (INHALATION) PRN
OUTPATIENT
Start: 2024-02-12

## 2024-01-29 RX ADMIN — SODIUM CHLORIDE 300 MG: 9 INJECTION, SOLUTION INTRAVENOUS at 12:07

## 2024-01-29 RX ADMIN — SODIUM CHLORIDE 20 ML/HR: 9 INJECTION, SOLUTION INTRAVENOUS at 12:06

## 2024-01-29 NOTE — PROGRESS NOTES
Venofer 300 mg infusion completed per order. Pt monitored for 30 minutes post infusion. Pt tolerated well.

## 2024-01-29 NOTE — DISCHARGE INSTRUCTIONS
iron sucrose (injection)  Pronunciation:  EYE urn MARY barrientos  Brand:  Venofer  What is the most important information I should know about iron sucrose?  Follow all directions on your medicine label and package. Tell each of your healthcare providers about all your medical conditions, allergies, and all medicines you use.  What is iron sucrose?  Iron sucrose is used to treat iron deficiency anemia in people with kidney disease.  Iron sucrose is for use in adults and children at least 2 years old.  Iron sucrose is not for treating other forms of anemia not caused by iron deficiency.   Iron sucrose may also be used for purposes not listed in this medication guide.  What should I discuss with my healthcare provider before I receive iron sucrose?  You should not be treated with this medicine if you have ever had an allergic reaction to an iron injection.  Tell your doctor if you have ever had:  hemochromatosis or iron overload (the buildup of excess iron).  Tell your doctor if you are pregnant or plan to become pregnant. Iron sucrose can harm an unborn baby if you have a severe reaction to this medicine during your second or third trimester. However, not treating iron deficiency anemia during pregnancy may cause complications such as premature birth or low birth weight. The benefit of treating your condition during pregnancy may outweigh any risks.  If you are breastfeeding, tell your doctor if you notice diarrhea or constipation in the nursing baby.  How is iron sucrose given?  Iron sucrose is given as an infusion into a vein. A healthcare provider will give you this injection.  This medicine is sometimes given slowly, and the infusion can take up to 2.5 hours to complete.  Tell your caregivers if you feel any burning, pain, or swelling around the IV needle when iron sucrose is injected.  You will be watched closely for at least 30 minutes to make sure you do not have an allergic reaction.  You will need frequent  pharmacist.  Copyright 2048-2459 [employer] Version: 7.01. Revision date: 1/27/2021.  Care instructions adapted under license by [employer]. If you have questions about a medical condition or this instruction, always ask your healthcare professional. [employer] disclaims any warranty or liability for your use of this information.

## 2024-02-05 ENCOUNTER — HOSPITAL ENCOUNTER (OUTPATIENT)
Dept: INFUSION THERAPY | Age: 49
Setting detail: INFUSION SERIES
Discharge: HOME | End: 2024-02-05
Payer: COMMERCIAL

## 2024-02-05 VITALS
HEART RATE: 84 BPM | TEMPERATURE: 98 F | OXYGEN SATURATION: 98 % | SYSTOLIC BLOOD PRESSURE: 104 MMHG | DIASTOLIC BLOOD PRESSURE: 70 MMHG | RESPIRATION RATE: 18 BRPM

## 2024-02-05 DIAGNOSIS — D50.8 OTHER IRON DEFICIENCY ANEMIA: Primary | ICD-10-CM

## 2024-02-05 DIAGNOSIS — K90.9 IRON MALABSORPTION: ICD-10-CM

## 2024-02-05 PROCEDURE — 2580000003 HC RX 258: Performed by: NURSE PRACTITIONER

## 2024-02-05 PROCEDURE — 96366 THER/PROPH/DIAG IV INF ADDON: CPT

## 2024-02-05 PROCEDURE — 6360000002 HC RX W HCPCS: Performed by: NURSE PRACTITIONER

## 2024-02-05 PROCEDURE — 96365 THER/PROPH/DIAG IV INF INIT: CPT

## 2024-02-05 RX ORDER — SODIUM CHLORIDE 0.9 % (FLUSH) 0.9 %
5-40 SYRINGE (ML) INJECTION PRN
Status: CANCELLED | OUTPATIENT
Start: 2024-02-12

## 2024-02-05 RX ORDER — EPINEPHRINE 1 MG/ML
0.3 INJECTION, SOLUTION, CONCENTRATE INTRAVENOUS PRN
OUTPATIENT
Start: 2024-02-12

## 2024-02-05 RX ORDER — DIPHENHYDRAMINE HYDROCHLORIDE 50 MG/ML
50 INJECTION INTRAMUSCULAR; INTRAVENOUS
OUTPATIENT
Start: 2024-02-12

## 2024-02-05 RX ORDER — ALBUTEROL SULFATE 90 UG/1
4 AEROSOL, METERED RESPIRATORY (INHALATION) PRN
OUTPATIENT
Start: 2024-02-12

## 2024-02-05 RX ORDER — SODIUM CHLORIDE 9 MG/ML
INJECTION, SOLUTION INTRAVENOUS CONTINUOUS
OUTPATIENT
Start: 2024-02-12

## 2024-02-05 RX ORDER — SODIUM CHLORIDE 9 MG/ML
5-250 INJECTION, SOLUTION INTRAVENOUS PRN
Status: CANCELLED | OUTPATIENT
Start: 2024-02-12

## 2024-02-05 RX ORDER — ONDANSETRON 2 MG/ML
8 INJECTION INTRAMUSCULAR; INTRAVENOUS
OUTPATIENT
Start: 2024-02-12

## 2024-02-05 RX ORDER — ACETAMINOPHEN 325 MG/1
650 TABLET ORAL
OUTPATIENT
Start: 2024-02-12

## 2024-02-05 RX ORDER — SODIUM CHLORIDE 9 MG/ML
5-250 INJECTION, SOLUTION INTRAVENOUS PRN
Status: DISCONTINUED | OUTPATIENT
Start: 2024-02-05 | End: 2024-02-06 | Stop reason: HOSPADM

## 2024-02-05 RX ADMIN — SODIUM CHLORIDE 20 ML/HR: 9 INJECTION, SOLUTION INTRAVENOUS at 10:39

## 2024-02-05 RX ADMIN — SODIUM CHLORIDE 400 MG: 9 INJECTION, SOLUTION INTRAVENOUS at 10:39

## 2024-02-08 ENCOUNTER — TELEPHONE (OUTPATIENT)
Dept: HEMATOLOGY | Age: 49
End: 2024-02-08

## 2024-02-12 ENCOUNTER — HOSPITAL ENCOUNTER (OUTPATIENT)
Dept: INFUSION THERAPY | Age: 49
Discharge: HOME | End: 2024-02-12
Payer: COMMERCIAL

## 2024-02-12 ENCOUNTER — OFFICE VISIT (OUTPATIENT)
Dept: HEMATOLOGY | Age: 49
End: 2024-02-12
Payer: COMMERCIAL

## 2024-02-12 VITALS
HEIGHT: 69 IN | SYSTOLIC BLOOD PRESSURE: 110 MMHG | WEIGHT: 183.6 LBS | TEMPERATURE: 97.8 F | OXYGEN SATURATION: 98 % | DIASTOLIC BLOOD PRESSURE: 80 MMHG | HEART RATE: 92 BPM | BODY MASS INDEX: 27.19 KG/M2

## 2024-02-12 DIAGNOSIS — D62 ACUTE BLOOD LOSS ANEMIA: Primary | ICD-10-CM

## 2024-02-12 DIAGNOSIS — Z71.89 CARE PLAN DISCUSSED WITH PATIENT: ICD-10-CM

## 2024-02-12 DIAGNOSIS — D64.9 ANEMIA, UNSPECIFIED TYPE: ICD-10-CM

## 2024-02-12 DIAGNOSIS — K62.89 RECTAL MASS: ICD-10-CM

## 2024-02-12 DIAGNOSIS — D69.1 ABNORMAL PLATELET FUNCTION (HCC): ICD-10-CM

## 2024-02-12 LAB
BASOPHILS # BLD: 0.06 K/UL (ref 0.01–0.08)
BASOPHILS NFR BLD: 1 % (ref 0.1–1.2)
EOSINOPHIL # BLD: 0.18 K/UL (ref 0.04–0.54)
EOSINOPHIL NFR BLD: 3 % (ref 0.7–7)
ERYTHROCYTE [DISTWIDTH] IN BLOOD BY AUTOMATED COUNT: 18.8 % (ref 11.6–14.4)
HCT VFR BLD AUTO: 42.2 % (ref 40.1–51)
HGB BLD-MCNC: 12.5 G/DL (ref 13.7–17.5)
LYMPHOCYTES # BLD: 2.02 K/UL (ref 1.18–3.74)
LYMPHOCYTES NFR BLD: 33.6 % (ref 19.3–53.1)
MCH RBC QN AUTO: 24.7 PG (ref 25.7–32.2)
MCHC RBC AUTO-ENTMCNC: 29.6 G/DL (ref 32.3–36.5)
MCV RBC AUTO: 83.4 FL (ref 79–92.2)
MONOCYTES # BLD: 0.37 K/UL (ref 0.24–0.82)
MONOCYTES NFR BLD: 6.1 % (ref 4.7–12.5)
NEUTROPHILS # BLD: 3.38 K/UL (ref 1.56–6.13)
NEUTS SEG NFR BLD: 56.1 % (ref 34–71.1)
PLATELET # BLD AUTO: 296 K/UL (ref 163–337)
PMV BLD AUTO: 9.6 FL (ref 7.4–10.4)
RBC # BLD AUTO: 5.06 M/UL (ref 4.63–6.08)
WBC # BLD AUTO: 6.02 K/UL (ref 4.23–9.07)

## 2024-02-12 PROCEDURE — 99212 OFFICE O/P EST SF 10 MIN: CPT

## 2024-02-12 PROCEDURE — 85025 COMPLETE CBC W/AUTO DIFF WBC: CPT

## 2024-02-12 PROCEDURE — 36415 COLL VENOUS BLD VENIPUNCTURE: CPT

## 2024-02-12 PROCEDURE — 99214 OFFICE O/P EST MOD 30 MIN: CPT | Performed by: NURSE PRACTITIONER

## 2024-02-12 NOTE — PROGRESS NOTES
Progress Note      Pt Name: Vu Ying  YOB: 1975  MRN: [id number]    Date of evaluation: 2/12/2024  History Obtained From:  Patient, EMR    Portions of this note have been copied forward, however, changed to reflect the most current clinical status of this patient.    Chief Complaint   Patient presents with    Follow-up     Acute blood loss anemia           HISTORY OF PRESENT ILLNESS:    Vu Ying is a pleasant 48-year-old  gentleman with a history of acute blood loss anemia secondary to bleeding rectal polyp/mass at 10 cm, exacerbated by abnormal platelet function from Plavix/aspirin (taken for CAD).  Vu was hospitalized 10/30/2023-11/6/2023 during which time he required both pRBC and platelet transfusions.  He was given Methylprednisolone 25 mg IV once on 11/1/2023.  Plavix and aspirin were held.    Vu was referred to Dr. Rickie Perez as an outpatient regarding rectal polyp/mass.  He inadvertently missed his appointment on 11/17/2023, thinking it was 11/27/2023.  Appointment was rescheduled and Vu was evaluated by Dr. Perez 12/1/2023 with plans for rectal protocol MRI.  Depending on location possible attempt for TAMIS procedure.     Vu states he tried to gather information regarding the  shunt he had placed in 1985 regarding MRI compatibility.  He was informed  of purged records.  He states the physician that placed the shunt has passed away.  Vu states imaging personnel informed him that most likely shunts placed in 1985 contained metal clips, thus without further information, MRI was declined.    Regarding anemia, Vu received Venofer 1/29/2024 and 2/5/2024. 3rd and final dose is scheduled 2/13/2024 which will total 1000 mg IV.      Hgb is improved, 12.5/MCV 83.4 today.  Vu denies further bleeding per rectum.    HEMATOLOGY HISTORY: Acute Blood Loss Anemia R/T rectal mass  Vu Ying was seen in hematology consultation by Dr. Angela Pineda 11/1/2023 during inpatient

## 2024-02-13 ENCOUNTER — HOSPITAL ENCOUNTER (OUTPATIENT)
Dept: INFUSION THERAPY | Age: 49
Setting detail: INFUSION SERIES
Discharge: HOME | End: 2024-02-13
Payer: COMMERCIAL

## 2024-02-13 VITALS
HEART RATE: 74 BPM | RESPIRATION RATE: 18 BRPM | SYSTOLIC BLOOD PRESSURE: 108 MMHG | TEMPERATURE: 97.6 F | DIASTOLIC BLOOD PRESSURE: 78 MMHG | OXYGEN SATURATION: 95 %

## 2024-02-13 DIAGNOSIS — D50.8 OTHER IRON DEFICIENCY ANEMIA: ICD-10-CM

## 2024-02-13 DIAGNOSIS — K90.9 IRON MALABSORPTION: Primary | ICD-10-CM

## 2024-02-13 PROCEDURE — 96366 THER/PROPH/DIAG IV INF ADDON: CPT

## 2024-02-13 PROCEDURE — 6360000002 HC RX W HCPCS: Performed by: NURSE PRACTITIONER

## 2024-02-13 PROCEDURE — 96365 THER/PROPH/DIAG IV INF INIT: CPT

## 2024-02-13 PROCEDURE — 2580000003 HC RX 258: Performed by: NURSE PRACTITIONER

## 2024-02-13 RX ORDER — SODIUM CHLORIDE 0.9 % (FLUSH) 0.9 %
5-40 SYRINGE (ML) INJECTION PRN
OUTPATIENT
Start: 2024-02-13

## 2024-02-13 RX ORDER — ACETAMINOPHEN 325 MG/1
650 TABLET ORAL
OUTPATIENT
Start: 2024-02-13

## 2024-02-13 RX ORDER — ONDANSETRON 2 MG/ML
8 INJECTION INTRAMUSCULAR; INTRAVENOUS
OUTPATIENT
Start: 2024-02-13

## 2024-02-13 RX ORDER — SODIUM CHLORIDE 9 MG/ML
5-250 INJECTION, SOLUTION INTRAVENOUS PRN
OUTPATIENT
Start: 2024-02-13

## 2024-02-13 RX ORDER — ALBUTEROL SULFATE 90 UG/1
4 AEROSOL, METERED RESPIRATORY (INHALATION) PRN
OUTPATIENT
Start: 2024-02-13

## 2024-02-13 RX ORDER — DIPHENHYDRAMINE HYDROCHLORIDE 50 MG/ML
50 INJECTION INTRAMUSCULAR; INTRAVENOUS
OUTPATIENT
Start: 2024-02-13

## 2024-02-13 RX ORDER — EPINEPHRINE 1 MG/ML
0.3 INJECTION, SOLUTION, CONCENTRATE INTRAVENOUS PRN
OUTPATIENT
Start: 2024-02-13

## 2024-02-13 RX ORDER — SODIUM CHLORIDE 9 MG/ML
5-250 INJECTION, SOLUTION INTRAVENOUS PRN
Status: DISCONTINUED | OUTPATIENT
Start: 2024-02-13 | End: 2024-02-14 | Stop reason: HOSPADM

## 2024-02-13 RX ORDER — SODIUM CHLORIDE 0.9 % (FLUSH) 0.9 %
5-40 SYRINGE (ML) INJECTION PRN
Status: DISCONTINUED | OUTPATIENT
Start: 2024-02-13 | End: 2024-02-14 | Stop reason: HOSPADM

## 2024-02-13 RX ORDER — SODIUM CHLORIDE 9 MG/ML
INJECTION, SOLUTION INTRAVENOUS CONTINUOUS
OUTPATIENT
Start: 2024-02-13

## 2024-02-13 RX ADMIN — SODIUM CHLORIDE 20 ML/HR: 9 INJECTION, SOLUTION INTRAVENOUS at 10:31

## 2024-02-13 RX ADMIN — SODIUM CHLORIDE 300 MG: 9 INJECTION, SOLUTION INTRAVENOUS at 10:32

## 2024-02-13 NOTE — PROGRESS NOTES
Pt arrived to OPIT. PIV inserted and brisk blood return noted. Pt received 300mg Venofer. Tolerated well. Pt monitored post infusion. No s/s adverse reaction.    Electronically signed by Andressa Sparks RN on 2/13/2024 at 12:35 PM

## 2024-02-19 ENCOUNTER — OFFICE VISIT (OUTPATIENT)
Dept: SURGERY | Age: 49
End: 2024-02-19
Payer: COMMERCIAL

## 2024-02-19 VITALS
OXYGEN SATURATION: 98 % | HEIGHT: 69 IN | BODY MASS INDEX: 27.44 KG/M2 | WEIGHT: 185.3 LBS | TEMPERATURE: 97.3 F | HEART RATE: 101 BPM

## 2024-02-19 DIAGNOSIS — K62.5 ANAL BLEEDING: Primary | ICD-10-CM

## 2024-02-19 PROCEDURE — 99212 OFFICE O/P EST SF 10 MIN: CPT | Performed by: SURGERY

## 2024-02-19 ASSESSMENT — ENCOUNTER SYMPTOMS
SORE THROAT: 0
ABDOMINAL PAIN: 0
BACK PAIN: 0
SHORTNESS OF BREATH: 0
COUGH: 0
EYE PAIN: 0
ABDOMINAL DISTENTION: 0
EYE REDNESS: 0
BLOOD IN STOOL: 1

## 2024-02-19 NOTE — PROGRESS NOTES
light.   Cardiovascular:      Rate and Rhythm: Normal rate and regular rhythm.   Pulmonary:      Effort: Pulmonary effort is normal. No respiratory distress.   Abdominal:      General: There is no distension.      Palpations: Abdomen is soft.      Tenderness: There is no abdominal tenderness. There is no guarding.   Genitourinary:     Rectum: External hemorrhoid and internal hemorrhoid present. No anal fissure.      Comments: Mild external hemorrhoids, enlarged right posterior internal hemorrhoids, no evidence of acute bleeding, inflammation, or thrombosis  Musculoskeletal:         General: No swelling or deformity.      Cervical back: Neck supple. No rigidity.   Skin:     General: Skin is warm and dry.   Neurological:      General: No focal deficit present.      Mental Status: He is alert. Mental status is at baseline.   Psychiatric:         Mood and Affect: Mood normal.         Behavior: Behavior normal.           Assessment and plan:  48 year old male with rectal bleeding, mild hemorrhoid disease  I discussed with the patient that he may have had some bleeding from his hemorrhoid tissue, but most likely from his known rectal polyp. Explained to the patient that I can see in the chart where Dr. Bee's office tried to get a hold of him to schedule the EUS. He states he had a call that looked like possible spam, so did no answer, and I encouraged him to answer. Gave the patient a hand out for bowel regimen and encouraged him to increase water intake. Follow up in general surgery as needed.    Karen Carlson MD  2/19/2024  9:02 AM

## 2024-03-11 ENCOUNTER — ANESTHESIA EVENT (OUTPATIENT)
Dept: ENDOSCOPY | Age: 49
End: 2024-03-11
Payer: COMMERCIAL

## 2024-03-11 ENCOUNTER — TELEPHONE (OUTPATIENT)
Dept: GASTROENTEROLOGY | Age: 49
End: 2024-03-11

## 2024-03-11 NOTE — TELEPHONE ENCOUNTER
Called patient to remind them of their procedure with Dr. Macho Bee  at [facility]  on 3/13/24 to arrive at 830AM     No ans / no way to LM

## 2024-03-12 NOTE — ANESTHESIA PRE PROCEDURE
orders.   Risk factor modification.   Medical management.      Signatures      ----------------------------------------------------------------   Electronically signed by Sav Araya MD(Performing   Physician) on 10/10/2023 10:36   ------------------------------------------------       Neuro/Psych:                ROS comment: Chronic benzo use  S/p  shunt GI/Hepatic/Renal:   (+) bowel prep         ROS comment: Rectal polyp.   Endo/Other:    (+) blood dyscrasia: anemia:..                 Abdominal: normal exam            Vascular: negative vascular ROS.         Other Findings:       Anesthesia Plan      general and TIVA     ASA 3       Induction: intravenous.      Anesthetic plan and risks discussed with patient.                    Jordana Doyle, APRN - CRNA   3/12/2024

## 2024-03-13 ENCOUNTER — HOSPITAL ENCOUNTER (OUTPATIENT)
Age: 49
Setting detail: OUTPATIENT SURGERY
Discharge: HOME | End: 2024-03-13
Attending: INTERNAL MEDICINE | Admitting: INTERNAL MEDICINE
Payer: COMMERCIAL

## 2024-03-13 ENCOUNTER — ANESTHESIA (OUTPATIENT)
Dept: ENDOSCOPY | Age: 49
End: 2024-03-13
Payer: COMMERCIAL

## 2024-03-13 ENCOUNTER — TELEPHONE (OUTPATIENT)
Dept: GASTROENTEROLOGY | Age: 49
End: 2024-03-13

## 2024-03-13 VITALS
BODY MASS INDEX: 27.11 KG/M2 | OXYGEN SATURATION: 96 % | HEIGHT: 69 IN | WEIGHT: 183 LBS | SYSTOLIC BLOOD PRESSURE: 118 MMHG | RESPIRATION RATE: 16 BRPM | HEART RATE: 75 BPM | DIASTOLIC BLOOD PRESSURE: 62 MMHG | TEMPERATURE: 97.6 F

## 2024-03-13 DIAGNOSIS — K62.89 RECTAL MASS: ICD-10-CM

## 2024-03-13 PROCEDURE — 45335 SIGMOIDOSCOPY W/SUBMUC INJ: CPT | Performed by: INTERNAL MEDICINE

## 2024-03-13 PROCEDURE — 45349 SIGMOIDOSCOPY W/RESECTION: CPT | Performed by: INTERNAL MEDICINE

## 2024-03-13 PROCEDURE — 88305 TISSUE EXAM BY PATHOLOGIST: CPT

## 2024-03-13 PROCEDURE — 6360000002 HC RX W HCPCS: Performed by: NURSE ANESTHETIST, CERTIFIED REGISTERED

## 2024-03-13 PROCEDURE — 3609008300 HC SIGMOIDOSCOPY W/BIOPSY SINGLE/MULTIPLE: Performed by: INTERNAL MEDICINE

## 2024-03-13 PROCEDURE — 2580000003 HC RX 258: Performed by: INTERNAL MEDICINE

## 2024-03-13 PROCEDURE — 2500000003 HC RX 250 WO HCPCS: Performed by: NURSE ANESTHETIST, CERTIFIED REGISTERED

## 2024-03-13 PROCEDURE — 2709999900 HC NON-CHARGEABLE SUPPLY: Performed by: INTERNAL MEDICINE

## 2024-03-13 PROCEDURE — 7100000011 HC PHASE II RECOVERY - ADDTL 15 MIN: Performed by: INTERNAL MEDICINE

## 2024-03-13 PROCEDURE — 7100000010 HC PHASE II RECOVERY - FIRST 15 MIN: Performed by: INTERNAL MEDICINE

## 2024-03-13 PROCEDURE — 3700000001 HC ADD 15 MINUTES (ANESTHESIA): Performed by: INTERNAL MEDICINE

## 2024-03-13 PROCEDURE — 3700000000 HC ANESTHESIA ATTENDED CARE: Performed by: INTERNAL MEDICINE

## 2024-03-13 PROCEDURE — C1889 IMPLANT/INSERT DEVICE, NOC: HCPCS | Performed by: INTERNAL MEDICINE

## 2024-03-13 PROCEDURE — 2720000010 HC SURG SUPPLY STERILE: Performed by: INTERNAL MEDICINE

## 2024-03-13 DEVICE — INSTINCT PLUS ENDOSCOPIC CLIPPING DEVICE
Type: IMPLANTABLE DEVICE | Site: RECTUM | Status: FUNCTIONAL
Brand: INSTINCT

## 2024-03-13 DEVICE — CLIP
Type: IMPLANTABLE DEVICE | Site: RECTUM | Status: FUNCTIONAL
Brand: RESOLUTION 360™ ULTRA CLIP

## 2024-03-13 RX ORDER — LIDOCAINE HYDROCHLORIDE 10 MG/ML
INJECTION, SOLUTION INFILTRATION; PERINEURAL PRN
Status: DISCONTINUED | OUTPATIENT
Start: 2024-03-13 | End: 2024-03-13 | Stop reason: SDUPTHER

## 2024-03-13 RX ORDER — SODIUM CHLORIDE, SODIUM LACTATE, POTASSIUM CHLORIDE, CALCIUM CHLORIDE 600; 310; 30; 20 MG/100ML; MG/100ML; MG/100ML; MG/100ML
INJECTION, SOLUTION INTRAVENOUS CONTINUOUS
Status: DISCONTINUED | OUTPATIENT
Start: 2024-03-13 | End: 2024-03-13 | Stop reason: HOSPADM

## 2024-03-13 RX ORDER — PROPOFOL 10 MG/ML
INJECTION, EMULSION INTRAVENOUS PRN
Status: DISCONTINUED | OUTPATIENT
Start: 2024-03-13 | End: 2024-03-13 | Stop reason: SDUPTHER

## 2024-03-13 RX ORDER — MIDAZOLAM HYDROCHLORIDE 1 MG/ML
INJECTION INTRAMUSCULAR; INTRAVENOUS PRN
Status: DISCONTINUED | OUTPATIENT
Start: 2024-03-13 | End: 2024-03-13 | Stop reason: SDUPTHER

## 2024-03-13 RX ORDER — PROPOFOL 10 MG/ML
INJECTION, EMULSION INTRAVENOUS PRN
Status: DISCONTINUED | OUTPATIENT
Start: 2024-03-13 | End: 2024-03-13

## 2024-03-13 RX ADMIN — SODIUM CHLORIDE, SODIUM LACTATE, POTASSIUM CHLORIDE, AND CALCIUM CHLORIDE: 600; 310; 30; 20 INJECTION, SOLUTION INTRAVENOUS at 08:56

## 2024-03-13 RX ADMIN — SODIUM CHLORIDE, SODIUM LACTATE, POTASSIUM CHLORIDE, AND CALCIUM CHLORIDE: 600; 310; 30; 20 INJECTION, SOLUTION INTRAVENOUS at 10:12

## 2024-03-13 RX ADMIN — LIDOCAINE HYDROCHLORIDE 40 MG: 10 INJECTION, SOLUTION INFILTRATION; PERINEURAL at 09:49

## 2024-03-13 RX ADMIN — MIDAZOLAM 2 MG: 1 INJECTION INTRAMUSCULAR; INTRAVENOUS at 09:48

## 2024-03-13 RX ADMIN — PROPOFOL 740 MG: 10 INJECTION, EMULSION INTRAVENOUS at 09:49

## 2024-03-13 ASSESSMENT — PAIN - FUNCTIONAL ASSESSMENT: PAIN_FUNCTIONAL_ASSESSMENT: NONE - DENIES PAIN

## 2024-03-13 NOTE — H&P
remain unseen for now-[facility]    DIAGNOSTIC CARDIAC CATH LAB PROCEDURE      SIGMOIDOSCOPY N/A 11/01/2023    Dr Richter-Ongoing lower GI bleeding, probably originating from polypoid rectal mass, higher levels of the colon remain unseen for now-[facility]    UPPER GASTROINTESTINAL ENDOSCOPY N/A 11/03/2023    Dr Richter-Normal esophagus and stomach, mild nonbleeding duodenitis    VENTRICULOPERITONEAL SHUNT  1985       Social History:  Social History     Tobacco Use    Smoking status: Never    Smokeless tobacco: Never   Vaping Use    Vaping Use: Never used   Substance Use Topics    Alcohol use: Never    Drug use: Never       Vital Signs:   Vitals:    03/13/24 0831   BP: 112/79   Pulse: (!) 104   Resp: 18   Temp: 98.2 °F (36.8 °C)   SpO2: 97%        Physical Exam:  Cardiac:  [x]WNL  []Comments:  Pulmonary:  [x]WNL   []Comments:  Neuro/Mental Status:  [x]WNL  []Comments:  Abdominal:  [x]WNL    []Comments:  Other:   []WNL  []Comments:    Informed Consent:  The risks and benefits of the procedure have been discussed with either the patient or if they cannot consent, their representative.    Assessment:  Patient examined and appropriate for planned sedation and procedure.     Plan:  Proceed with planned sedation and procedure as above.         Macho Bee MD

## 2024-03-13 NOTE — ANESTHESIA POSTPROCEDURE EVALUATION
Department of Anesthesiology  Postprocedure Note    Patient: Vu Ying  MRN: [id number]  YOB: 1975  Date of evaluation: 3/13/2024    Procedure Summary       Date: 03/13/24 Room / Location: [place] [id number] / [facility]    Anesthesia Start: 0944 Anesthesia Stop:     Procedure: SIGMOIDOSCOPY BIOPSY FLEXIBLE w/EMR Diagnosis:       Rectal mass      (Rectal mass [K62.89])    Surgeons: Macho Bee MD Responsible Provider: Jordana Doyle APRN - CRNA    Anesthesia Type: general, TIVA ASA Status: 4            Anesthesia Type: No value filed.    Danika Phase I: Danika Score: 10    Danika Phase II:      Anesthesia Post Evaluation    Patient location during evaluation: bedside  Patient participation: complete - patient participated  Level of consciousness: sleepy but conscious  Pain score: 0  Airway patency: patent  Nausea & Vomiting: no nausea and no vomiting  Cardiovascular status: hemodynamically stable and blood pressure returned to baseline  Respiratory status: acceptable and nasal cannula  Hydration status: stable  Pain management: adequate    No notable events documented.

## 2024-03-13 NOTE — OP NOTE
Patient: Vu Ying : 1975  [facility] Rec#: [id number] Acc#: [id number]   Primary Care Provider Kevin Dumont MD    Date of Procedure:  3/13/2024    Endoscopist: Macho Bee MD    Referring Provider: Kevin Dumont MD,     Operation Performed:   Flexible Sigmoidoscopy with EMR of colorectal mass/polyp  Flexible Sigmoidoscopy with submucosal injection    Indications: known rectal mass s/p superficial biopsies only - c/w TVA    Anesthesia:  Sedation was administered by anesthesia who monitored the patient during the procedure.    I met with uV Ying prior to procedure. We discussed the procedure itself, and I have discussed the risks of endoscopy (including-- but not limited to-- pain, discomfort, bleeding potentially requiring second endoscopic procedure and/or blood transfusion, organ perforation requiring operative repair, damage to organs near the colon, infection, aspiration, cardiopulmonary/allergic reaction), benefits, indications to endoscopy. Additionally, we discussed options other than colonoscopy. The patient expressed understanding. All questions answered. The patient decided to proceed with the procedure.  Signed informed consent was placed on the chart.    Blood Loss: minimal    Withdrawal time: n/a  Bowel Prep: adequate     Complications: no immediate complications    DESCRIPTION OF PROCEDURE:     A time out was performed. After written informed consent was obtained, the patient was placed in the left lateral position.     The perianal area was inspected, and a digital rectal exam was performed. A rectal exam was performed: normal tone, no palpable lesions. At this point, a forward viewing Olympus colonoscope was inserted into the anus and carefully advanced to the sigmoid colon. The colonoscope was then slowly withdrawn with careful inspection of the mucosa in a linear and circumferential fashion. The scope was retroflexed in the rectum. Suction was utilized during the procedure to remove

## 2024-03-13 NOTE — DISCHARGE INSTRUCTIONS
Recommendations:  1. Repeat colonoscopy: pending pathology - max of 6-8 weeks to re-evaluate site and consider EUS if abnormal cells noted on pathology.   2. Await biopsy results.   3. No NSAIDs or ASA for 10 days post procedure.     Sigmoidoscopy: What to Expect at Home  Your Recovery     A sigmoidoscopy lets your doctor look inside the lower part of your large intestine. This is also called the colon. The doctor uses a lighted tube called a sigmoidoscope (or scope).  This test allows the doctor to look for small growths (called polyps), cancer, bleeding, hemorrhoids, or other problems. The doctor also may have used the scope to remove polyps. Or they may have used it to take tissue samples that need to be tested.  You shouldn't have any pain after the procedure. But it is normal to pass gas. You may have mild discomfort from having gas.  If your doctor removed polyps, you will likely need to schedule a colonoscopy to look at the whole colon.  This care sheet gives you a general idea about how long it will take for you to recover. But each person recovers at a different pace. Follow the steps below to get better as quickly as possible.  How can you care for yourself at home?  Activity    Most people are able to return to work right away unless they have had a sedative during the procedure.     You may need someone to drive you home if you have had a sedative. In most cases, you can drive yourself home.   Diet    You can eat your normal diet.     Be sure to drink plenty of liquids to replace those you have lost during the preparation for the procedure.   Exercise    You can return to normal exercise right away.   Medicine    Your doctor will tell you if and when you can restart your medicines. You also will be given instructions about taking any new medicines.     If you stopped taking aspirin or some other blood thinner, your doctor will tell you when to start taking it again.   Follow-up care is a key part of

## 2024-03-13 NOTE — TELEPHONE ENCOUNTER
Lanre,    Per Dr Bee today:    Findings:      Immediately upon entering the rectum a large polypoid mass was noted. This lesion was 2-3cm in size. There appeared to be a focal stalk noted. No other lesions identified.      EMR was pursued with loop assisted snare resection. Using two separate Olympus Endoloops the lesion was grasped and  from the surrounding mucosa. The edges were identified. There appeared to be adequate separation of the polyp from the underlying deep layers of the rectum. In a piecemeal fashion, the lesion was removed with hot snare polypectomy. The specimen was placed in two separate containers. Specimen B was sent separately as the tissue immediately at the resection site in hopes of better identifying invasion of any abnormal cells.      A total of 3 hemostatic clips were successfully placed (3 successfully, 2 unsuccessfully) across the resection site.      Using submucosal injection of epinephrine (1:10,000), the resection was additionally injected to help prevent post resection bleeding.      At the end of the procedure, there was no bleeding noted.         Retroflexion in the rectum was normal and revealed no further abnormalities            Recommendations:  1. Repeat colonoscopy: pending pathology - max of 6-8 weeks to re-evaluate site and consider EUS if abnormal cells noted on pathology.   2. Await biopsy results.   3. No NSAIDs of ASA for 10 days post procedure.      Findings and recommendations were discussed w/ the patient. A copy of the images was provided.     Macho Bee MD  3/13/2024  10:48 AM

## 2024-03-28 ENCOUNTER — TELEPHONE (OUTPATIENT)
Dept: GASTROENTEROLOGY | Age: 49
End: 2024-03-28

## 2024-03-28 NOTE — TELEPHONE ENCOUNTER
----- Message from Macho Bee MD sent at 3/28/2024  2:00 PM CDT -----  Please tell patient no signs of cancer in resected specimen. I do recommend repeat procedure as discussed.

## 2024-05-09 ENCOUNTER — TELEPHONE (OUTPATIENT)
Dept: CARDIOLOGY CLINIC | Age: 49
End: 2024-05-09

## 2024-05-10 ENCOUNTER — TELEPHONE (OUTPATIENT)
Dept: HEMATOLOGY | Age: 49
End: 2024-05-10

## 2024-05-14 ENCOUNTER — TELEPHONE (OUTPATIENT)
Dept: CARDIOLOGY CLINIC | Age: 49
End: 2024-05-14

## 2024-05-20 ENCOUNTER — TELEPHONE (OUTPATIENT)
Dept: CARDIOLOGY CLINIC | Age: 49
End: 2024-05-20

## 2024-05-22 ENCOUNTER — TELEPHONE (OUTPATIENT)
Dept: GASTROENTEROLOGY | Age: 49
End: 2024-05-22

## 2024-05-22 NOTE — TELEPHONE ENCOUNTER
Patient's procedure has been denied - MRN [id number] - He is scheduled for EUS on 6/11/24 for abn CT.  The denial letter (not medically necessary)  from the insurance company is scanned into patient's chart.      P2P - Patient has not seen Suze or Anabell in the office - so I am sending the info to you for the appeal.      [employer] [phone] is the review company to call for P2P    [employer]  - member ID:  [id number]    PROCEDURE CPT CODE: 15267  DIAGNOSIS CODES:  R89.9, R93.89

## 2024-06-11 ENCOUNTER — TELEPHONE (OUTPATIENT)
Dept: GASTROENTEROLOGY | Age: 49
End: 2024-06-11

## 2024-06-11 NOTE — TELEPHONE ENCOUNTER
6/11/24 - lm asking patient to call back regarding missed procedure today.     SURG - 6/11/24@1000 - CLN/EUS - ABN CELLS ON PATHOLOGY - MARY - NITISH -

## 2024-07-12 ENCOUNTER — HOSPITAL ENCOUNTER (EMERGENCY)
Age: 49
Discharge: HOME | End: 2024-07-12
Payer: COMMERCIAL

## 2024-07-12 VITALS
WEIGHT: 182.98 LBS | TEMPERATURE: 98.3 F | HEART RATE: 77 BPM | RESPIRATION RATE: 16 BRPM | SYSTOLIC BLOOD PRESSURE: 104 MMHG | BODY MASS INDEX: 27.1 KG/M2 | DIASTOLIC BLOOD PRESSURE: 81 MMHG | HEIGHT: 69 IN | OXYGEN SATURATION: 98 %

## 2024-07-12 DIAGNOSIS — Z87.19 HISTORY OF RECTAL BLEEDING: Primary | ICD-10-CM

## 2024-07-12 LAB
ALBUMIN SERPL-MCNC: 3.8 G/DL (ref 3.5–5.2)
ALP SERPL-CCNC: 77 U/L (ref 40–130)
ALT SERPL-CCNC: 14 U/L (ref 5–41)
ANION GAP SERPL CALCULATED.3IONS-SCNC: 8 MMOL/L (ref 7–19)
APTT PPP: 28.8 SEC (ref 26–36.2)
AST SERPL-CCNC: 14 U/L (ref 5–40)
BASOPHILS # BLD: 0.1 K/UL (ref 0–0.2)
BASOPHILS NFR BLD: 1.2 % (ref 0–1)
BILIRUB SERPL-MCNC: 0.4 MG/DL (ref 0.2–1.2)
BUN SERPL-MCNC: 14 MG/DL (ref 6–20)
CALCIUM SERPL-MCNC: 8.8 MG/DL (ref 8.6–10)
CHLORIDE SERPL-SCNC: 107 MMOL/L (ref 98–111)
CO2 SERPL-SCNC: 25 MMOL/L (ref 22–29)
CREAT SERPL-MCNC: 1 MG/DL (ref 0.5–1.2)
EOSINOPHIL # BLD: 0.3 K/UL (ref 0–0.6)
EOSINOPHIL NFR BLD: 5 % (ref 0–5)
ERYTHROCYTE [DISTWIDTH] IN BLOOD BY AUTOMATED COUNT: 13.3 % (ref 11.5–14.5)
FERRITIN SERPL-MCNC: 58.7 NG/ML (ref 30–400)
GLUCOSE SERPL-MCNC: 94 MG/DL (ref 74–109)
HCT VFR BLD AUTO: 47.9 % (ref 42–52)
HGB BLD-MCNC: 15 G/DL (ref 14–18)
IMM GRANULOCYTES # BLD: 0 K/UL
INR PPP: 1.03 (ref 0.88–1.18)
IRON SATN MFR SERPL: 43 % (ref 14–50)
IRON SERPL-MCNC: 113 UG/DL (ref 59–158)
LYMPHOCYTES # BLD: 1.9 K/UL (ref 1.1–4.5)
LYMPHOCYTES NFR BLD: 33 % (ref 20–40)
MCH RBC QN AUTO: 29 PG (ref 27–31)
MCHC RBC AUTO-ENTMCNC: 31.3 G/DL (ref 33–37)
MCV RBC AUTO: 92.5 FL (ref 80–94)
MONOCYTES # BLD: 0.4 K/UL (ref 0–0.9)
MONOCYTES NFR BLD: 6.3 % (ref 0–10)
NEUTROPHILS # BLD: 3.2 K/UL (ref 1.5–7.5)
NEUTS SEG NFR BLD: 54.3 % (ref 50–65)
PLATELET # BLD AUTO: 241 K/UL (ref 130–400)
PMV BLD AUTO: 9.8 FL (ref 9.4–12.4)
POTASSIUM SERPL-SCNC: 4.5 MMOL/L (ref 3.5–5)
PROT SERPL-MCNC: 6.5 G/DL (ref 6.6–8.7)
PROTHROMBIN TIME: 13.2 SEC (ref 12–14.6)
RBC # BLD AUTO: 5.18 M/UL (ref 4.7–6.1)
SODIUM SERPL-SCNC: 140 MMOL/L (ref 136–145)
TIBC SERPL-MCNC: 265 UG/DL (ref 250–400)
WBC # BLD AUTO: 5.8 K/UL (ref 4.8–10.8)

## 2024-07-12 PROCEDURE — 36415 COLL VENOUS BLD VENIPUNCTURE: CPT

## 2024-07-12 PROCEDURE — 80053 COMPREHEN METABOLIC PANEL: CPT

## 2024-07-12 PROCEDURE — 85730 THROMBOPLASTIN TIME PARTIAL: CPT

## 2024-07-12 PROCEDURE — 83540 ASSAY OF IRON: CPT

## 2024-07-12 PROCEDURE — 85610 PROTHROMBIN TIME: CPT

## 2024-07-12 PROCEDURE — 99283 EMERGENCY DEPT VISIT LOW MDM: CPT

## 2024-07-12 PROCEDURE — 83550 IRON BINDING TEST: CPT

## 2024-07-12 PROCEDURE — 85025 COMPLETE CBC W/AUTO DIFF WBC: CPT

## 2024-07-12 PROCEDURE — 82728 ASSAY OF FERRITIN: CPT

## 2024-07-12 RX ORDER — HYDROCORTISONE ACETATE 25 MG/1
25 SUPPOSITORY RECTAL 2 TIMES DAILY
Qty: 6 SUPPOSITORY | Refills: 0 | Status: SHIPPED | OUTPATIENT
Start: 2024-07-12 | End: 2024-07-15

## 2024-07-12 ASSESSMENT — ENCOUNTER SYMPTOMS
PHOTOPHOBIA: 0
COUGH: 0
BLOOD IN STOOL: 1
EYE DISCHARGE: 0
COLOR CHANGE: 0
SHORTNESS OF BREATH: 0
APNEA: 0
BACK PAIN: 0
EYE ITCHING: 0

## 2024-07-12 ASSESSMENT — PAIN - FUNCTIONAL ASSESSMENT: PAIN_FUNCTIONAL_ASSESSMENT: NONE - DENIES PAIN

## 2024-07-12 NOTE — DISCHARGE INSTRUCTIONS
Plan on following up closely with GI and hematology/oncology as planned  Soft diet and OTC medication stool softener normal blood work today no blood in stool today  Trial topical treatment

## 2024-07-12 NOTE — ED PROVIDER NOTES
[facility] EMERGENCY DEPT  eMERGENCYdEPARTMENT eNCOUnter      Pt Name: Vu Ying  MRN: [id number]  Birthdate 1975  Date of evaluation: 7/12/2024  Provider:AMANDA Obrien    CHIEF COMPLAINT       Chief Complaint   Patient presents with    Rectal Bleeding     Starting on 10th, intermittent, bright red         HISTORY OF PRESENT ILLNESS  (Location/Symptom, Timing/Onset, Context/Setting, Quality, Duration, Modifying Factors, Severity.)   Vu Ying is a 48 y.o. male who presents to the emergency department with complaints of rectal bleeding bright red blood denies any painful BM or rectal pain no itching he had a colonoscopy on June 11 with Dr. Bee locally with GI 1 polyp removed biopsy negative he is not on any blood thinners he is a very anxious person in general he states this happened on the 10th and has since resolved but wanted to just make sure nothing going on he has a history of iron malabsorption and deficiency sees hematology oncology next month and he endorses that he did miss his last appointment he was supposed to do a rescheduling with GI.  He has a soft abdomen no complaints there no nausea vomiting has not had anything to eat in about 12 hours he is anxious about eating because he does not want to have to have a BM that would be very large.    HPI    Nursing Notes were reviewed and I agree.    REVIEW OF SYSTEMS    (2-9 systems for level 4, 10 or more for level 5)     Review of Systems   Constitutional:  Negative for activity change, appetite change, chills and fever.   HENT:  Negative for congestion and dental problem.    Eyes:  Negative for photophobia, discharge and itching.   Respiratory:  Negative for apnea, cough and shortness of breath.    Cardiovascular:  Negative for chest pain.   Gastrointestinal:  Positive for blood in stool.   Musculoskeletal:  Negative for arthralgias, back pain, gait problem, myalgias and neck pain.   Skin:  Negative for color change, pallor and rash.   Neurological:

## 2024-07-16 ENCOUNTER — HOSPITAL ENCOUNTER (EMERGENCY)
Age: 49
Discharge: HOME | End: 2024-07-17
Attending: EMERGENCY MEDICINE
Payer: COMMERCIAL

## 2024-07-16 DIAGNOSIS — R42 EPISODE OF DIZZINESS: Primary | ICD-10-CM

## 2024-07-16 LAB
BASOPHILS # BLD: 0.1 K/UL (ref 0–0.2)
BASOPHILS NFR BLD: 1 % (ref 0–1)
EOSINOPHIL # BLD: 0.3 K/UL (ref 0–0.6)
EOSINOPHIL NFR BLD: 4.2 % (ref 0–5)
ERYTHROCYTE [DISTWIDTH] IN BLOOD BY AUTOMATED COUNT: 13.2 % (ref 11.5–14.5)
HCT VFR BLD AUTO: 43.8 % (ref 42–52)
HGB BLD-MCNC: 14.6 G/DL (ref 14–18)
IMM GRANULOCYTES # BLD: 0 K/UL
LYMPHOCYTES # BLD: 2.9 K/UL (ref 1.1–4.5)
LYMPHOCYTES NFR BLD: 37.5 % (ref 20–40)
MCH RBC QN AUTO: 29.7 PG (ref 27–31)
MCHC RBC AUTO-ENTMCNC: 33.3 G/DL (ref 33–37)
MCV RBC AUTO: 89 FL (ref 80–94)
MONOCYTES # BLD: 0.5 K/UL (ref 0–0.9)
MONOCYTES NFR BLD: 6.2 % (ref 0–10)
NEUTROPHILS # BLD: 3.9 K/UL (ref 1.5–7.5)
NEUTS SEG NFR BLD: 50.8 % (ref 50–65)
PLATELET # BLD AUTO: 256 K/UL (ref 130–400)
PMV BLD AUTO: 9.9 FL (ref 9.4–12.4)
RBC # BLD AUTO: 4.92 M/UL (ref 4.7–6.1)
WBC # BLD AUTO: 7.7 K/UL (ref 4.8–10.8)

## 2024-07-16 PROCEDURE — 99284 EMERGENCY DEPT VISIT MOD MDM: CPT

## 2024-07-16 PROCEDURE — 93005 ELECTROCARDIOGRAM TRACING: CPT | Performed by: EMERGENCY MEDICINE

## 2024-07-16 ASSESSMENT — PAIN - FUNCTIONAL ASSESSMENT: PAIN_FUNCTIONAL_ASSESSMENT: 0-10

## 2024-07-16 ASSESSMENT — PAIN SCALES - GENERAL: PAINLEVEL_OUTOF10: 0

## 2024-07-17 VITALS
OXYGEN SATURATION: 99 % | DIASTOLIC BLOOD PRESSURE: 81 MMHG | BODY MASS INDEX: 27.02 KG/M2 | SYSTOLIC BLOOD PRESSURE: 116 MMHG | HEART RATE: 81 BPM | WEIGHT: 183 LBS | RESPIRATION RATE: 15 BRPM | TEMPERATURE: 97.9 F

## 2024-07-17 LAB
ALBUMIN SERPL-MCNC: 4 G/DL (ref 3.5–5.2)
ALP SERPL-CCNC: 89 U/L (ref 40–130)
ALT SERPL-CCNC: 19 U/L (ref 5–41)
ANION GAP SERPL CALCULATED.3IONS-SCNC: 12 MMOL/L (ref 7–19)
AST SERPL-CCNC: 14 U/L (ref 5–40)
BILIRUB SERPL-MCNC: 0.3 MG/DL (ref 0.2–1.2)
BUN SERPL-MCNC: 12 MG/DL (ref 6–20)
CALCIUM SERPL-MCNC: 9 MG/DL (ref 8.6–10)
CHLORIDE SERPL-SCNC: 102 MMOL/L (ref 98–111)
CO2 SERPL-SCNC: 25 MMOL/L (ref 22–29)
CREAT SERPL-MCNC: 1 MG/DL (ref 0.5–1.2)
EKG P AXIS: 62 DEGREES
EKG P-R INTERVAL: 170 MS
EKG Q-T INTERVAL: 386 MS
EKG QRS DURATION: 90 MS
EKG QTC CALCULATION (BAZETT): 403 MS
EKG T AXIS: 46 DEGREES
GLUCOSE SERPL-MCNC: 122 MG/DL (ref 74–109)
LIPASE SERPL-CCNC: 27 U/L (ref 13–60)
MAGNESIUM SERPL-MCNC: 2.1 MG/DL (ref 1.6–2.6)
POTASSIUM SERPL-SCNC: 3.3 MMOL/L (ref 3.5–5)
PROT SERPL-MCNC: 6.7 G/DL (ref 6.6–8.7)
SODIUM SERPL-SCNC: 139 MMOL/L (ref 136–145)

## 2024-07-17 PROCEDURE — 80053 COMPREHEN METABOLIC PANEL: CPT

## 2024-07-17 PROCEDURE — 83690 ASSAY OF LIPASE: CPT

## 2024-07-17 PROCEDURE — 83735 ASSAY OF MAGNESIUM: CPT

## 2024-07-17 PROCEDURE — 93010 ELECTROCARDIOGRAM REPORT: CPT | Performed by: INTERNAL MEDICINE

## 2024-07-17 PROCEDURE — 36415 COLL VENOUS BLD VENIPUNCTURE: CPT

## 2024-07-17 PROCEDURE — 85025 COMPLETE CBC W/AUTO DIFF WBC: CPT

## 2024-07-17 ASSESSMENT — ENCOUNTER SYMPTOMS
RESPIRATORY NEGATIVE: 1
GASTROINTESTINAL NEGATIVE: 1
EYES NEGATIVE: 1

## 2024-07-17 NOTE — ED PROVIDER NOTES
General: Skin is warm and dry.      Findings: No rash.   Neurological:      Mental Status: He is alert and oriented to person, place, and time.      Cranial Nerves: No cranial nerve deficit.      Coordination: Coordination normal.   Psychiatric:         Behavior: Behavior normal.         DIAGNOSTIC RESULTS       RADIOLOGY:   Non-plain film images such as CT, Ultrasound and MRI are read by the radiologist. Plainradiographic images are visualized and preliminarily interpreted by the emergency physician with the below findings:      Interpretation per the Radiologist below, if available at the time of this note:    No orders to display         ED BEDSIDE ULTRASOUND:   Performed by ED Physician - none    LABS:  Labs Reviewed   COMPREHENSIVE METABOLIC PANEL - Abnormal; Notable for the following components:       Result Value    Potassium 3.3 (*)     Glucose 122 (*)     All other components within normal limits   CBC WITH AUTO DIFFERENTIAL   LIPASE   MAGNESIUM       All other labs were within normal range or not returned as of this dictation.    Medications - No data to display    EMERGENCY DEPARTMENT COURSE and DIFFERENTIALDIAGNOSIS/MDM:   Vitals:    Vitals:    07/16/24 2332 07/17/24 0039   BP: 117/85 116/81   Pulse: 80 81   Resp: 13 15   Temp: 97.9 °F (36.6 °C)    TempSrc: Oral    SpO2: 98% 99%   Weight: 83 kg (183 lb)      EKG: All EKG's are interpreted by the Emergency Department Physician who either signs or Co-signs this chart in the absence of a cardiologist.      MDM  Number of Diagnoses or Management Options  Episode of dizziness: new and does not require workup     Amount and/or Complexity of Data Reviewed  Clinical lab tests: ordered and reviewed  Review and summarize past medical records: yes  Independent visualization of images, tracings, or specimens: yes    Risk of Complications, Morbidity, and/or Mortality  Presenting problems: high  Diagnostic procedures: low  Management options: low    Patient

## 2024-08-07 ENCOUNTER — TELEPHONE (OUTPATIENT)
Dept: HEMATOLOGY | Age: 49
End: 2024-08-07

## 2024-08-07 NOTE — TELEPHONE ENCOUNTER
Called Patient and reminded patient of their appointment on 08/09/2024 and patient confirmed they would be here. Reminded patient to just come at appointment time, and to not come at the lab appointment time. Reminded patient that we will not check them in any more than 30 minutes before appointment time.  We have now moved to the [facility] [place] that is located between our old office and the ER at the [facility]

## 2024-08-09 ENCOUNTER — HOSPITAL ENCOUNTER (OUTPATIENT)
Dept: INFUSION THERAPY | Age: 49
Discharge: HOME | End: 2024-08-09
Payer: COMMERCIAL

## 2024-08-09 ENCOUNTER — OFFICE VISIT (OUTPATIENT)
Dept: HEMATOLOGY | Age: 49
End: 2024-08-09
Payer: COMMERCIAL

## 2024-08-09 VITALS
WEIGHT: 183 LBS | HEIGHT: 69 IN | OXYGEN SATURATION: 97 % | SYSTOLIC BLOOD PRESSURE: 106 MMHG | DIASTOLIC BLOOD PRESSURE: 64 MMHG | BODY MASS INDEX: 27.11 KG/M2 | HEART RATE: 66 BPM | TEMPERATURE: 97.7 F

## 2024-08-09 DIAGNOSIS — K90.9 IRON MALABSORPTION: ICD-10-CM

## 2024-08-09 DIAGNOSIS — D62 ACUTE BLOOD LOSS ANEMIA: ICD-10-CM

## 2024-08-09 DIAGNOSIS — D69.1 ABNORMAL PLATELET FUNCTION (HCC): ICD-10-CM

## 2024-08-09 DIAGNOSIS — K62.89 RECTAL MASS: Primary | ICD-10-CM

## 2024-08-09 DIAGNOSIS — K62.89 RECTAL MASS: ICD-10-CM

## 2024-08-09 LAB
BASOPHILS # BLD: 0.07 K/UL (ref 0.01–0.08)
BASOPHILS NFR BLD: 1.1 % (ref 0.1–1.2)
EOSINOPHIL # BLD: 0.22 K/UL (ref 0.04–0.54)
EOSINOPHIL NFR BLD: 3.5 % (ref 0.7–7)
ERYTHROCYTE [DISTWIDTH] IN BLOOD BY AUTOMATED COUNT: 13.2 % (ref 11.6–14.4)
HCT VFR BLD AUTO: 45.9 % (ref 40.1–51)
HGB BLD-MCNC: 15.4 G/DL (ref 13.7–17.5)
LYMPHOCYTES # BLD: 2.26 K/UL (ref 1.18–3.74)
LYMPHOCYTES NFR BLD: 35.6 % (ref 19.3–53.1)
MCH RBC QN AUTO: 30.2 PG (ref 25.7–32.2)
MCHC RBC AUTO-ENTMCNC: 33.6 G/DL (ref 32.3–36.5)
MCV RBC AUTO: 90 FL (ref 79–92.2)
MONOCYTES # BLD: 0.41 K/UL (ref 0.24–0.82)
MONOCYTES NFR BLD: 6.5 % (ref 4.7–12.5)
NEUTROPHILS # BLD: 3.37 K/UL (ref 1.56–6.13)
NEUTS SEG NFR BLD: 53.1 % (ref 34–71.1)
PLATELET # BLD AUTO: 273 K/UL (ref 163–337)
PMV BLD AUTO: 9.7 FL (ref 7.4–10.4)
RBC # BLD AUTO: 5.1 M/UL (ref 4.63–6.08)
WBC # BLD AUTO: 6.34 K/UL (ref 4.23–9.07)

## 2024-08-09 PROCEDURE — 99213 OFFICE O/P EST LOW 20 MIN: CPT | Performed by: NURSE PRACTITIONER

## 2024-08-09 PROCEDURE — 99212 OFFICE O/P EST SF 10 MIN: CPT

## 2024-08-09 PROCEDURE — 36415 COLL VENOUS BLD VENIPUNCTURE: CPT

## 2024-08-09 PROCEDURE — 85025 COMPLETE CBC W/AUTO DIFF WBC: CPT

## 2024-08-14 ENCOUNTER — TELEPHONE (OUTPATIENT)
Dept: GASTROENTEROLOGY | Age: 49
End: 2024-08-14

## 2024-08-14 ENCOUNTER — TELEPHONE (OUTPATIENT)
Dept: HEMATOLOGY | Age: 49
End: 2024-08-14

## 2024-08-14 NOTE — TELEPHONE ENCOUNTER
Spoke to Aminata arriaga/Dr Perez and he is on the schedule for Friday.    ----- Message from Dr. Macho Bee MD sent at 8/13/2024  4:32 PM CDT -----  Well.    I see what/why you are asking.     I think I would still see Dr Perez if I was him.   It's always good to get one more opinion.     I would still probably get a repeat scope with me and see what he says. Its fine to see him first if Mr Ying can get in this Friday.     Thanks  ----- Message -----  From: Ale Liu APRN  Sent: 8/12/2024  10:12 AM CDT  To: MD Dr. Rohit Asif,  [place] patient was supposed to have seen Dr. Perez some time ago re: rectal mass/polyp.  From what I understand, you were recently able to resect this. He missed his apt for re-scope with you in June and I had my office reschedule him (apt is in 9/2024).    Dr. Perez's office is asking if his apt needs to be rescheduled with them.  They can see him this Friday in town here if so.    I am assuming he does not need to see Dr. Perez?, but I wanted to check with you.    ThanksAle

## 2024-08-14 NOTE — TELEPHONE ENCOUNTER
Attempted to contact patient x2 regarding follow up appointment scheduled for Dr. Perez this Friday 8/16/24 at 10:45. Unable to reach patient, left voicemail. Masalat message sent to patient. I will attempt to contact patient again tomorrow morning, 8/15/24.

## 2024-08-15 ENCOUNTER — TELEPHONE (OUTPATIENT)
Dept: HEMATOLOGY | Age: 49
End: 2024-08-15

## 2024-08-15 NOTE — TELEPHONE ENCOUNTER
Third attempt to contact patient regarding appointment Friday with Dr. Perez. No answer from patient.

## 2024-09-06 ENCOUNTER — TELEPHONE (OUTPATIENT)
Dept: GASTROENTEROLOGY | Age: 49
End: 2024-09-06

## 2024-09-06 NOTE — TELEPHONE ENCOUNTER
Called patient to remind them of their procedure with Dr. Macho Bee  at [facility]  on 9/10/24  to arrive at 1000     NO ANS / LM

## 2025-02-05 ENCOUNTER — TELEPHONE (OUTPATIENT)
Dept: HEMATOLOGY | Age: 50
End: 2025-02-05

## 2025-02-05 NOTE — TELEPHONE ENCOUNTER
I called patient and reminded patient of their appt on 02/10/2025 and patient informed me they needed to reschedule their appointment to another date. I have let the  know of this as well as the provider and nurses. His sister said that he has lost his insurance and hasnt had any in months and he cant afford to come because he lost his job.

## (undated) DEVICE — RETRIEVAL DEVICE: Brand: RESCUENET™

## (undated) DEVICE — FORCEPS BX L L240CM DIA2.4MM RAD JAW 4 HOT FOR POLYP DISP

## (undated) DEVICE — Device

## (undated) DEVICE — SINGLE USE LIGATING DEVICE: Brand: SINGLE USE LIGATING DEVICE

## (undated) DEVICE — SNARE ENDOSCP AD SM L240CM LOOP W1.3CM SHTH DIA2.4MM POLYP

## (undated) DEVICE — GLOVE ORTHO 8   MSG9480

## (undated) DEVICE — FORCEPS BX L240CM JAW DIA2.8MM L CAP W/ NDL MIC MESH TOOTH

## (undated) DEVICE — ENDO KIT,LOURDES HOSPITAL: Brand: MEDLINE INDUSTRIES, INC.

## (undated) DEVICE — SNARE ENDOSCP L240CM SHTH DIA2.4MM LOOP W20MM MIN WRK CHN